# Patient Record
Sex: MALE | Race: WHITE | NOT HISPANIC OR LATINO | Employment: FULL TIME | URBAN - METROPOLITAN AREA
[De-identification: names, ages, dates, MRNs, and addresses within clinical notes are randomized per-mention and may not be internally consistent; named-entity substitution may affect disease eponyms.]

---

## 2020-05-14 ENCOUNTER — TRANSFERRED RECORDS (OUTPATIENT)
Dept: HEALTH INFORMATION MANAGEMENT | Facility: CLINIC | Age: 60
End: 2020-05-14
Payer: COMMERCIAL

## 2020-10-26 ENCOUNTER — THERAPY VISIT (OUTPATIENT)
Dept: PHYSICAL THERAPY | Facility: CLINIC | Age: 60
End: 2020-10-26
Payer: COMMERCIAL

## 2020-10-26 DIAGNOSIS — N50.811 PAIN IN RIGHT TESTICLE: Primary | ICD-10-CM

## 2020-10-26 PROCEDURE — 97161 PT EVAL LOW COMPLEX 20 MIN: CPT | Mod: GP | Performed by: PHYSICAL THERAPIST

## 2020-10-26 PROCEDURE — 97110 THERAPEUTIC EXERCISES: CPT | Mod: GP | Performed by: PHYSICAL THERAPIST

## 2020-10-26 NOTE — LETTER
[facility] ATHLETIC [facility]  [address]  [facility] [address]  [phone]    2020    Re: Saulo Riley   :   1960  MRN:  [id number]   REFERRING PHYSICIAN:   Eligio Díaz    [facility]TH[facility]  Date of Initial Evaluation:  10/26/2020  Visits:  Rxs Used: 1  Reason for Referral:  Pain in right testicle    EVALUATION SUMMARY    [facility] Initial Evaluation  Subjective:  Onset of right testicular pain/swelling  of unknown etiology. 6- pt underwent a hydrocelectomy. Pt has recently noted a return of right groin/testicular pain radiating into the right lower extremity to the knee. Pt referred by MD for physical therapy on 10-.  The history is provided by the patient. No  was used.   Pertinent medical history includes: diabetes, high blood pressure and overweight. Current medications:  High blood pressure medication.    Current occupation is cook.   Primary job tasks include:  Lifting/carrying, prolonged standing and repetitive tasks.   Therapist Generated HPI Evaluation  Type of problem:  Pelvic dysfunction.  This is a chronic condition.  Condition occurred with:  Other reason.  Where condition occurred: other.  Patient reports pain:  Groin (right testicle).  Pain is described as aching (throbbing) and is intermittent.  Pain radiates to:  Anterior thigh right. Pain is worse during the day.  Since onset symptoms are unchanged.  Associated with: none. Symptoms are exacerbated by walking (standing, lifting, stairs, hip flexion. pt denies pain with urination, bowel movements, erections and ejaculations)  and relieved by rest (supporting the testicles).  Imaging testing: pt is scheduled for a testicular ultrasound.  Past treatment: none.   Restrictions due to condition include:  Working in normal job without restrictions.  Barriers include:  None as reported by patient.  Decreased right lower  extremity flexibility:  Adductors; Piriformis; Hip Flexors and Hamstrings    Pelvic Dysfunction Evaluation:    Bladder/Pelvic Problems:  Pt noted pain with right lateral flexion. All other lumbar ROM was pain free.   Flexibility:    Tightness present at:Adductors; Iliopsoas; Hamstrings; Piriformis and Gluteals    Re: Saulo Riley   :   1960    Abdominal Wall:    Trigger Points:  Iliopsoas  External Assessment:    Skin Condition:  Normal  Bearing Down/Coughing:  Normal  Internal Assessment:    Sensory Exam:  Normal  Contraction/Grade:  Weak squeeze, 2 second hold (2)    Hip Evaluation  Hip Strength:    Flexion:   Right: 4/5    Pain: weak/painful  Extension:  Right: 5/5    Pain:    Abduction:  Right: 5/5    Pain:  Adduction:  Right: 5/5   Pain:  Internal Rotation:  Right: 5/5   Pain:  External Rotation:  Right: 5/5   Pain:  Knee Flexion:  Right: 4/5    Pain: weak/painful  Knee Extension:  Right: 4+/5    Pain:    Assessment/Plan:    Patient is a 60 year old male with pelvic complaints.    Patient has the following significant findings with corresponding treatment plan.                Diagnosis 1:  Right testicular pain  Pain -  manual therapy and home program  Decreased ROM/flexibility - manual therapy, therapeutic exercise, therapeutic activity and home program  Decreased strength - therapeutic exercise, therapeutic activities and home program    Therapy Evaluation Codes:   1) History comprised of:   Personal factors that impact the plan of care:      None.    Comorbidity factors that impact the plan of care are:      Diabetes, High blood pressure and Overweight.     Medications impacting care: High blood pressure, Pain and diabetic medication.  2) Examination of Body Systems comprised of:   Body structures and functions that impact the plan of care:      Pelvis.   Activity limitations that impact the plan of care are:      Cooking, Lifting, Stairs, Standing, Walking and Working.  3) Clinical presentation  characteristics are:   Stable/Uncomplicated.  4) Decision-Making    Low complexity using standardized patient assessment instrument and/or measureable assessment of functional outcome.  Cumulative Therapy Evaluation is: Low complexity.            Re: Saulo FEDERICO Osvaldo   :   1960      Previous and current functional limitations:  (See Goal Flow Sheet for this information)    Short term and Long term goals: (See Goal Flow Sheet for this information)     Communication ability:  Patient appears to be able to clearly communicate and understand verbal and written communication and follow directions correctly.  Treatment Explanation - The following has been discussed with the patient:   RX ordered/plan of care  Anticipated outcomes  Possible risks and side effects  This patient would benefit from PT intervention to resume normal activities.   Rehab potential is good.    Frequency:  1 X week, once daily  Duration:  for 6 weeks  Discharge Plan:  Achieve all LTG.  Independent in home treatment program.  Reach maximal therapeutic benefit.          Thank you for your referral.    INQUIRIES  Therapist: Enrrique Hammond [facility] [place]  [address] [address] [address]  Phone: [phone]  Fax: [phone]

## 2020-10-26 NOTE — PROGRESS NOTES
[facility] Initial Evaluation  Subjective:  Onset of right testicular pain/swelling 11-19 of unknown etiology. 6- pt underwent a hydrocelectomy. Pt has recently noted a return of right groin/testicular pain radiating into the right lower extremity to the knee. Pt referred by MD for physical therapy on 10-.    The history is provided by the patient. No  was used.   Therapist Generated HPI Evaluation         Type of problem:  Pelvic dysfunction.    This is a chronic condition.  Condition occurred with:  Other reason.  Where condition occurred: other.  Patient reports pain:  Groin (right testicle).  Pain is described as aching (throbbing) and is intermittent.  Pain radiates to:  Anterior thigh right. Pain is worse during the day.  Since onset symptoms are unchanged.  Associated with: none. Symptoms are exacerbated by walking (standing, lifting, stairs, hip flexion. pt denies pain with urination, bowel movements, erections and ejaculations)  and relieved by rest (supporting the testicles).  Imaging testing: pt is scheduled for a testicular ultrasound.  Past treatment: none.   Restrictions due to condition include:  Working in normal job without restrictions.  Barriers include:  None as reported by patient.                        Objective:        Flexibility/Screens:       Lower Extremity:      Decreased right lower extremity flexibility:  Adductors; Piriformis; Hip Flexors and Hamstrings                                       Pelvic Dysfunction Evaluation:    Bladder/Pelvic Problems:  Pt noted pain with right lateral flexion. All other lumbar ROM was pain free.             Flexibility:    Tightness present at:Adductors; Iliopsoas; Hamstrings; Piriformis and Gluteals    Abdominal Wall:      Trigger Points:  Iliopsoas          External Assessment:    Skin Condition:  Normal    Bearing Down/Coughing:  Normal        Internal Assessment:    Sensory Exam:   Normal  Contraction/Grade:  Weak squeeze, 2 second hold (2)                    Hip Evaluation    Hip Strength:    Flexion:   Right: 4/5    Pain: weak/painful                    Extension:  Right: 5/5    Pain:    Abduction:  Right: 5/5    Pain:  Adduction:  Right: 5/5   Pain:  Internal Rotation:  Right: 5/5   Pain:  External Rotation:  Right: 5/5   Pain:  Knee Flexion:  Right: 4/5    Pain: weak/painful  Knee Extension:  Right: 4+/5    Pain:                       General     ROS    Assessment/Plan:    Patient is a 60 year old male with pelvic complaints.    Patient has the following significant findings with corresponding treatment plan.                Diagnosis 1:  Right testicular pain  Pain -  manual therapy and home program  Decreased ROM/flexibility - manual therapy, therapeutic exercise, therapeutic activity and home program  Decreased strength - therapeutic exercise, therapeutic activities and home program    Therapy Evaluation Codes:   1) History comprised of:   Personal factors that impact the plan of care:      None.    Comorbidity factors that impact the plan of care are:      Diabetes, High blood pressure and Overweight.     Medications impacting care: High blood pressure, Pain and diabetic medication.  2) Examination of Body Systems comprised of:   Body structures and functions that impact the plan of care:      Pelvis.   Activity limitations that impact the plan of care are:      Cooking, Lifting, Stairs, Standing, Walking and Working.  3) Clinical presentation characteristics are:   Stable/Uncomplicated.  4) Decision-Making    Low complexity using standardized patient assessment instrument and/or measureable assessment of functional outcome.  Cumulative Therapy Evaluation is: Low complexity.    Previous and current functional limitations:  (See Goal Flow Sheet for this information)    Short term and Long term goals: (See Goal Flow Sheet for this information)     Communication ability:  Patient appears  to be able to clearly communicate and understand verbal and written communication and follow directions correctly.  Treatment Explanation - The following has been discussed with the patient:   RX ordered/plan of care  Anticipated outcomes  Possible risks and side effects  This patient would benefit from PT intervention to resume normal activities.   Rehab potential is good.    Frequency:  1 X week, once daily  Duration:  for 6 weeks  Discharge Plan:  Achieve all LTG.  Independent in home treatment program.  Reach maximal therapeutic benefit.    Please refer to the daily flowsheet for treatment today, total treatment time and time spent performing 1:1 timed codes.

## 2020-10-26 NOTE — PROGRESS NOTES
[facility] Initial Evaluation  Subjective:    Patient Health History             Pertinent medical history includes: diabetes, high blood pressure and overweight.            Current medications:  High blood pressure medication.    Current occupation is cook.   Primary job tasks include:  Lifting/carrying, prolonged standing and repetitive tasks.                                    Objective:  System    Physical Exam    General     ROS    Assessment/Plan:

## 2020-11-02 ENCOUNTER — THERAPY VISIT (OUTPATIENT)
Dept: PHYSICAL THERAPY | Facility: CLINIC | Age: 60
End: 2020-11-02
Payer: COMMERCIAL

## 2020-11-02 DIAGNOSIS — N50.811 PAIN IN RIGHT TESTICLE: ICD-10-CM

## 2020-11-02 PROCEDURE — 97112 NEUROMUSCULAR REEDUCATION: CPT | Mod: GP | Performed by: PHYSICAL THERAPIST

## 2020-11-02 PROCEDURE — 97110 THERAPEUTIC EXERCISES: CPT | Mod: GP | Performed by: PHYSICAL THERAPIST

## 2020-11-12 ENCOUNTER — THERAPY VISIT (OUTPATIENT)
Dept: PHYSICAL THERAPY | Facility: CLINIC | Age: 60
End: 2020-11-12
Payer: COMMERCIAL

## 2020-11-12 DIAGNOSIS — N50.811 PAIN IN RIGHT TESTICLE: ICD-10-CM

## 2020-11-12 PROCEDURE — 97110 THERAPEUTIC EXERCISES: CPT | Mod: GP | Performed by: PHYSICAL THERAPIST

## 2020-11-12 PROCEDURE — 97140 MANUAL THERAPY 1/> REGIONS: CPT | Mod: GP | Performed by: PHYSICAL THERAPIST

## 2020-11-12 PROCEDURE — 97112 NEUROMUSCULAR REEDUCATION: CPT | Mod: GP | Performed by: PHYSICAL THERAPIST

## 2020-11-12 NOTE — PROGRESS NOTES
Subjective:  HPI  Physical Exam                    Objective:  System    Physical Exam    General     ROS    Assessment/Plan:    SUBJECTIVE  Subjective changes as noted by pt:  Pt reports he was told by the doctor he has an infection. Pt started medication for the infection. Pt reports being sore today secondary to sleeping on his right side for the first time last night.      Current pain level: 4/10     Changes in function:  Pt notes decreased pain with standing and lifting at work  Adverse reaction to treatment or activity:  None    OBJECTIVE  Changes in objective findings:  Pt demonstrates improved control of pelvic floor muscles. Pt can relax pelvic floor muscles to a 1mV resting muscle tone. Trigger point noted right iliopsoas which decreased with sustained manual pressure from therapist.         ASSESSMENT  Saulo continues to require intervention to meet STG and LTG's: PT  Patient's symptoms are resolving.  Response to therapy has shown an improvement in  pain level and muscle control  Progress made towards STG/LTG?  Yes,     PLAN  Current treatment program is being advanced to more complex exercises.    PTA/ATC plan:  N/A    Please refer to the daily flowsheet for treatment today, total treatment time and time spent performing 1:1 timed codes.

## 2020-11-18 ENCOUNTER — AMBULATORY - HEALTHEAST (OUTPATIENT)
Dept: SURGERY | Facility: AMBULATORY SURGERY CENTER | Age: 60
End: 2020-11-18

## 2020-11-18 DIAGNOSIS — Z11.59 ENCOUNTER FOR SCREENING FOR OTHER VIRAL DISEASES: ICD-10-CM

## 2020-11-29 ENCOUNTER — AMBULATORY - HEALTHEAST (OUTPATIENT)
Dept: FAMILY MEDICINE | Facility: CLINIC | Age: 60
End: 2020-11-29

## 2020-11-29 DIAGNOSIS — Z11.59 ENCOUNTER FOR SCREENING FOR OTHER VIRAL DISEASES: ICD-10-CM

## 2020-12-01 ENCOUNTER — COMMUNICATION - HEALTHEAST (OUTPATIENT)
Dept: SCHEDULING | Facility: CLINIC | Age: 60
End: 2020-12-01

## 2020-12-03 ENCOUNTER — ANESTHESIA - HEALTHEAST (OUTPATIENT)
Dept: SURGERY | Facility: AMBULATORY SURGERY CENTER | Age: 60
End: 2020-12-03

## 2020-12-03 ASSESSMENT — MIFFLIN-ST. JEOR: SCORE: 2114

## 2020-12-04 ENCOUNTER — SURGERY - HEALTHEAST (OUTPATIENT)
Dept: SURGERY | Facility: AMBULATORY SURGERY CENTER | Age: 60
End: 2020-12-04

## 2021-01-21 PROBLEM — N50.811 PAIN IN RIGHT TESTICLE: Status: RESOLVED | Noted: 2020-10-26 | Resolved: 2021-01-21

## 2021-01-21 NOTE — PROGRESS NOTES
Subjective:Subjective changes as noted by pt:  Pt reports he was told by the doctor he has an infection. Pt started medication for the infection. Pt reports being sore today secondary to sleeping on his right side for the first time last night.      Current pain level: 4/10     Changes in function:  Pt notes decreased pain with standing and lifting at work  Adverse reaction to treatment or activity:  None     OBJECTIVE  Changes in objective findings:  Pt demonstrates improved control of pelvic floor muscles. Pt can relax pelvic floor muscles to a 1mV resting muscle tone. Trigger point noted right iliopsoas which decreased with sustained manual pressure from therapist.   HPI  Physical Exam                    Objective:  System    Physical Exam    General     ROS    Assessment/Plan:    ASSESSMENT/PLAN  Updated problem list and treatment plan: Diagnosis 1:  Testicular pain  Pain -  hot/cold therapy, self management, education, home program and biofeedback  Decreased ROM/flexibility - therapeutic exercise, therapeutic activity and home program  Decreased strength - therapeutic exercise, therapeutic activities and home program  Progress toward STG/LTGs have been made:  Yes,   Assessment of Progress: The patient's condition is improving.  Self Management Plans:  Patient has been instructed in a home treatment program.  I have re-evaluated this patient and find that the nature, scope, duration and intensity of the therapy is appropriate for the medical condition of the patient.  Saulo continues to require the following intervention to meet STG and LT's:  PT    Recommendations:  Pt has not returned for treatment since 11-. Pt discharged at this time.      Please refer to the daily flowsheet for treatment today, total treatment time and time spent performing 1:1 timed codes.

## 2021-06-05 VITALS — HEIGHT: 68 IN | WEIGHT: 292 LBS | BODY MASS INDEX: 44.25 KG/M2

## 2021-06-13 NOTE — ANESTHESIA CARE TRANSFER NOTE
Last vitals:   Vitals:    12/04/20 1514   BP: 136/73   Pulse: 76   Resp: 16   Temp: 37.1  C (98.7  F)   SpO2: 99%     Patient's level of consciousness is drowsy  Spontaneous respirations: yes  Maintains airway independently: yes  Dentition unchanged: yes  Oropharynx: oropharynx clear of all foreign objects    QCDR Measures:  ASA# 20 - Surgical Safety Checklist: WHO surgical safety checklist completed prior to induction    PQRS# 430 - Adult PONV Prevention: 4558F - Pt received => 2 anti-emetic agents (different classes) preop & intraop  ASA# 8 - Peds PONV Prevention: NA - Not pediatric patient, not GA or 2 or more risk factors NOT present  PQRS# 424 - Almaz-op Temp Management: 4559F - At least one body temp DOCUMENTED => 35.5C or 95.9F within required timeframe  PQRS# 426 - PACU Transfer Protocol: - Transfer of care checklist used  ASA# 14 - Acute Post-op Pain: ASA14B - Patient did NOT experience pain >= 7 out of 10  
Yes

## 2021-06-13 NOTE — ANESTHESIA POSTPROCEDURE EVALUATION
Patient: Saulo Riley  Procedure(s):  HYDROCELECTOMY  Anesthesia type: general    Patient location: Phase II Recovery  Last vitals:   Vitals Value Taken Time   /85 12/04/20 1601   Temp 36.5  C (97.7  F) 12/04/20 1554   Pulse 72 12/04/20 1605   Resp 16 12/04/20 1554   SpO2 94 % 12/04/20 1605   Vitals shown include unvalidated device data.  Post vital signs: stable  Level of consciousness: awake and responds to simple questions  Post-anesthesia pain: pain controlled  Post-anesthesia nausea and vomiting: no  Pulmonary: unassisted, return to baseline  Cardiovascular: stable and blood pressure at baseline  Hydration: adequate  Anesthetic events: no    QCDR Measures:  ASA# 11 - Almaz-op Cardiac Arrest: ASA11B - Patient did NOT experience unanticipated cardiac arrest  ASA# 12 - Almaz-op Mortality Rate: ASA12B - Patient did NOT die  ASA# 13 - PACU Re-Intubation Rate: ASA13B - Patient did NOT require a new airway mgmt  ASA# 10 - Composite Anes Safety: ASA10A - No serious adverse event    Additional Notes:

## 2021-06-13 NOTE — ANESTHESIA PREPROCEDURE EVALUATION
Anesthesia Evaluation      Patient summary reviewed     Airway   Mallampati: II  Neck ROM: full   Pulmonary - negative ROS and normal exam                          Cardiovascular - normal exam  (+) hypertension, , hypercholesterolemia,      Neuro/Psych    (+) CVA ,     Endo/Other    (+) diabetes mellitus type 2, obesity,      GI/Hepatic/Renal - negative ROS           Dental    (+) poor dentition and caps                       Anesthesia Plan  Planned anesthetic: general endotracheal    COVID neg 11/29    GETA - have glidescope nearby  1 PIV  Fentanyl prn, dilaudid if needed  Decadron, zofran   ASA 3   Induction: intravenous   Anesthetic plan and risks discussed with: patient  Anesthesia plan special considerations: increased risk of difficult airway,   Post-op plan: routine recovery

## 2022-04-06 ENCOUNTER — MEDICAL CORRESPONDENCE (OUTPATIENT)
Dept: HEALTH INFORMATION MANAGEMENT | Facility: CLINIC | Age: 62
End: 2022-04-06
Payer: COMMERCIAL

## 2022-04-06 ENCOUNTER — TRANSFERRED RECORDS (OUTPATIENT)
Dept: HEALTH INFORMATION MANAGEMENT | Facility: CLINIC | Age: 62
End: 2022-04-06
Payer: COMMERCIAL

## 2022-04-07 ENCOUNTER — TRANSCRIBE ORDERS (OUTPATIENT)
Dept: OTHER | Age: 62
End: 2022-04-07
Payer: COMMERCIAL

## 2022-04-07 DIAGNOSIS — R10.31 RIGHT INGUINAL PAIN: Primary | ICD-10-CM

## 2022-04-25 ENCOUNTER — OFFICE VISIT (OUTPATIENT)
Dept: SURGERY | Facility: CLINIC | Age: 62
End: 2022-04-25
Attending: UROLOGY
Payer: COMMERCIAL

## 2022-04-25 VITALS — WEIGHT: 292.6 LBS | HEIGHT: 68 IN | BODY MASS INDEX: 44.34 KG/M2

## 2022-04-25 DIAGNOSIS — R10.31 RIGHT INGUINAL PAIN: ICD-10-CM

## 2022-04-25 PROCEDURE — 99204 OFFICE O/P NEW MOD 45 MIN: CPT | Performed by: SURGERY

## 2022-04-25 RX ORDER — ALLOPURINOL 300 MG/1
300 TABLET ORAL
COMMUNITY
Start: 2022-03-29 | End: 2022-07-07

## 2022-04-25 RX ORDER — LISINOPRIL 40 MG/1
40 TABLET ORAL DAILY
COMMUNITY
Start: 2022-03-15

## 2022-04-25 RX ORDER — SITAGLIPTIN 100 MG/1
100 TABLET, FILM COATED ORAL DAILY
COMMUNITY
Start: 2022-02-25

## 2022-04-25 RX ORDER — GABAPENTIN 100 MG/1
CAPSULE ORAL PRN
COMMUNITY
Start: 2021-04-28

## 2022-04-25 NOTE — LETTER
4/25/2022         RE: Saulo Riley  [address] [address] [facility] [address]        Dear Colleague,    Thank you for referring your patient, Saulo Riley, to the [facility] [place]. Please see a copy of my visit note below.    HPI:  Saulo Riley is a 62 year old male who was referred to me by Francisco Herring for an inguinal hernia. He  presents today with complaints of right inner thigh discomfort for which she has had for several months.  He had back surgery for right lower extremity/shin discomfort and is currently recovering from this.  He has had a hydrocelectomy in the past on the same side.  He continues to have a right thigh discomfort which he states disappears when he squeezes his scrotum.  He denies any obvious bulges in the right inguinal space.  He denies any hip pain but has had a left-sided similar pain in the past which has since resolved.  He has difficulty raising his right leg up secondary to the discomfort.  He denies any fevers, chills, bulges or any other symptoms.    Allergies:Patient has no known allergies.    Past Medical History:   Diagnosis Date     Diabetes mellitus (H)      Hypertension      Stroke (H)     1980       Past Surgical History:   Procedure Laterality Date     BACK SURGERY       HYDROCELECTOMY SCROTAL       HYDROCELECTOMY SCROTAL N/A 12/4/2020    Procedure: HYDROCELECTOMY;  Surgeon: Eligio Díaz MD;  Location: [facility];  Service: Urology     NECK SURGERY       TONSILLECTOMY         CURRENT MEDS:  Current Outpatient Medications   Medication Sig Dispense Refill     allopurinoL (ZYLOPRIM) 300 MG tablet [ALLOPURINOL (ZYLOPRIM) 300 MG TABLET] Take 300 mg by mouth.       aspirin 81 MG EC tablet [ASPIRIN 81 MG EC TABLET] Take 81 mg by mouth daily.       atorvastatin (LIPITOR) 80 MG tablet [ATORVASTATIN (LIPITOR) 80 MG TABLET] Take 80 mg by mouth.       gabapentin (NEURONTIN) 100 MG capsule TAKE 2 CAPSULES (200  "MG) BY MOUTH AT BEDTIME.       JANUVIA 100 MG tablet Take 100 mg by mouth daily       lisinopril (ZESTRIL) 40 MG tablet Take 40 mg by mouth daily       metFORMIN (GLUCOPHAGE) 1000 MG tablet [METFORMIN (GLUCOPHAGE) 1000 MG TABLET] Take 1,000 mg by mouth.       allopurinol (ZYLOPRIM) 300 MG tablet Take 300 mg by mouth (Patient not taking: Reported on 4/25/2022)       lisinopriL (PRINIVIL,ZESTRIL) 20 MG tablet [LISINOPRIL (PRINIVIL,ZESTRIL) 20 MG TABLET] Take 20 mg by mouth. (Patient not taking: Reported on 4/25/2022)         Family history-reviewed and  non-contributory     reports that he has never smoked. He has never used smokeless tobacco. He reports previous alcohol use. He reports previous drug use.    Review of Systems -   The 12 system review is within normal limits except for as mentioned above.  General ROS: No complaints or constitutional symptoms  Ophthalmic ROS: No complaints of visual changes  Skin: No complaints or symptoms   Endocrine: No complaints or symptoms  Hematologic/Lymphatic: No symptoms or complaints  Psychiatric: No symptoms or complaints  Respiratory ROS: no cough, shortness of breath, or wheezing  Cardiovascular ROS: no chest pain or dyspnea on exertion  Gastrointestinal ROS: As per HPI  Genito-Urinary ROS: no dysuria, trouble voiding, or hematuria  Musculoskeletal ROS: no joint or muscle pain  Neurological ROS: no TIA or stroke symptoms    Ht 1.727 m (5' 8\")   Wt 132.7 kg (292 lb 9.6 oz)   BMI 44.49 kg/m    Body mass index is 44.49 kg/m .    EXAM:  GENERAL: Well developed male, No acute distress, pleasant and conversant   EYES: Pupils equal, round and reactive, no scleral icterus  ABDOMEN: Obese, no palpable or appreciated right inguinal hernias or left inguinal hernias, nontender to deep palpation of the internal ring  SKIN: Pink, warm and dry, no obvious rashes or lesions   NEURO:No focal deficits, ambulatory  MUSCULOSKELETAL:No obvious deformities, no swelling, normal " appearing          IMAGES:   Relevant images were reviewed and discussed with the patient.  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  He states the pain goes away when he squeezes his testicle.  This is not necessarily characteristic for an inguinal hernia.  He has had recent back surgery for similar pain although this was in the lower neurologic distribution.  I was not able to appreciate any obvious hernias on my physical exam.  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  The hydrocele has since been repaired.  It is not clear whether or not the spermatic cord lipoma is causing the discomfort either and this would be a rather odd presentation especially with the neurologic distribution and the difficulty raising his leg.  At this point I will repeat a CT scan of the abdomen and pelvis given the fact that the hydrocele has been repaired and I would like to see what the anatomy looks like.  I will call him with the results once this is done.      Peter Marcelo D.O. [facility]  [phone]  [facility] Department of Surgery      Again, thank you for allowing me to participate in the care of your patient.        Sincerely,        Fede Marcelo, DO

## 2022-04-25 NOTE — PROGRESS NOTES
HPI:  Saulo Riley is a 62 year old male who was referred to me by Francisco Herring for an inguinal hernia. He  presents today with complaints of right inner thigh discomfort for which she has had for several months.  He had back surgery for right lower extremity/shin discomfort and is currently recovering from this.  He has had a hydrocelectomy in the past on the same side.  He continues to have a right thigh discomfort which he states disappears when he squeezes his scrotum.  He denies any obvious bulges in the right inguinal space.  He denies any hip pain but has had a left-sided similar pain in the past which has since resolved.  He has difficulty raising his right leg up secondary to the discomfort.  He denies any fevers, chills, bulges or any other symptoms.    Allergies:Patient has no known allergies.    Past Medical History:   Diagnosis Date     Diabetes mellitus (H)      Hypertension      Stroke (H)     1980       Past Surgical History:   Procedure Laterality Date     BACK SURGERY       HYDROCELECTOMY SCROTAL       HYDROCELECTOMY SCROTAL N/A 12/4/2020    Procedure: HYDROCELECTOMY;  Surgeon: Eligio Díaz MD;  Location: [facility];  Service: Urology     NECK SURGERY       TONSILLECTOMY         CURRENT MEDS:  Current Outpatient Medications   Medication Sig Dispense Refill     allopurinoL (ZYLOPRIM) 300 MG tablet [ALLOPURINOL (ZYLOPRIM) 300 MG TABLET] Take 300 mg by mouth.       aspirin 81 MG EC tablet [ASPIRIN 81 MG EC TABLET] Take 81 mg by mouth daily.       atorvastatin (LIPITOR) 80 MG tablet [ATORVASTATIN (LIPITOR) 80 MG TABLET] Take 80 mg by mouth.       gabapentin (NEURONTIN) 100 MG capsule TAKE 2 CAPSULES (200 MG) BY MOUTH AT BEDTIME.       JANUVIA 100 MG tablet Take 100 mg by mouth daily       lisinopril (ZESTRIL) 40 MG tablet Take 40 mg by mouth daily       metFORMIN (GLUCOPHAGE) 1000 MG tablet [METFORMIN (GLUCOPHAGE) 1000 MG TABLET] Take 1,000 mg by mouth.       allopurinol (ZYLOPRIM) 300  "MG tablet Take 300 mg by mouth (Patient not taking: Reported on 4/25/2022)       lisinopriL (PRINIVIL,ZESTRIL) 20 MG tablet [LISINOPRIL (PRINIVIL,ZESTRIL) 20 MG TABLET] Take 20 mg by mouth. (Patient not taking: Reported on 4/25/2022)         Family history-reviewed and  non-contributory     reports that he has never smoked. He has never used smokeless tobacco. He reports previous alcohol use. He reports previous drug use.    Review of Systems -   The 12 system review is within normal limits except for as mentioned above.  General ROS: No complaints or constitutional symptoms  Ophthalmic ROS: No complaints of visual changes  Skin: No complaints or symptoms   Endocrine: No complaints or symptoms  Hematologic/Lymphatic: No symptoms or complaints  Psychiatric: No symptoms or complaints  Respiratory ROS: no cough, shortness of breath, or wheezing  Cardiovascular ROS: no chest pain or dyspnea on exertion  Gastrointestinal ROS: As per HPI  Genito-Urinary ROS: no dysuria, trouble voiding, or hematuria  Musculoskeletal ROS: no joint or muscle pain  Neurological ROS: no TIA or stroke symptoms    Ht 1.727 m (5' 8\")   Wt 132.7 kg (292 lb 9.6 oz)   BMI 44.49 kg/m    Body mass index is 44.49 kg/m .    EXAM:  GENERAL: Well developed male, No acute distress, pleasant and conversant   EYES: Pupils equal, round and reactive, no scleral icterus  ABDOMEN: Obese, no palpable or appreciated right inguinal hernias or left inguinal hernias, nontender to deep palpation of the internal ring  SKIN: Pink, warm and dry, no obvious rashes or lesions   NEURO:No focal deficits, ambulatory  MUSCULOSKELETAL:No obvious deformities, no swelling, normal appearing          IMAGES:   Relevant images were reviewed and discussed with the patient.  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  He states the pain goes away when he squeezes his testicle.  This is not necessarily characteristic for an inguinal hernia.  He has had recent back surgery for similar pain although this was in the lower neurologic distribution.  I was not able to appreciate any obvious hernias on my physical exam.  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  The hydrocele has since been repaired.  It is not clear whether or not the spermatic cord lipoma is causing the discomfort either and this would be a rather odd presentation especially with the neurologic distribution and the difficulty raising his leg.  At this point I will repeat a CT scan of the abdomen and pelvis given the fact that the hydrocele has been repaired and I would like to see what the anatomy looks like.  I will call him with the results once this is done.      Peter Marcelo D.O. [facility]  [phone]  [facility] Department of Surgery

## 2022-04-26 ENCOUNTER — TELEPHONE (OUTPATIENT)
Dept: SURGERY | Facility: CLINIC | Age: 62
End: 2022-04-26
Payer: COMMERCIAL

## 2022-04-26 DIAGNOSIS — R10.31 RIGHT GROIN PAIN: Primary | ICD-10-CM

## 2022-04-26 NOTE — TELEPHONE ENCOUNTER
Thomas called today asking to get his CT scan scheduled. He would like to have this done at [facility] in [place]. [phone]

## 2022-04-28 ENCOUNTER — TELEPHONE (OUTPATIENT)
Dept: SURGERY | Facility: CLINIC | Age: 62
End: 2022-04-28
Payer: COMMERCIAL

## 2022-04-28 NOTE — TELEPHONE ENCOUNTER
Thomas called back still hasnt heard about the CT scan.    Please call Thomas and advise.    Thanks!

## 2022-05-06 ENCOUNTER — TRANSCRIBE ORDERS (OUTPATIENT)
Dept: OTHER | Age: 62
End: 2022-05-06
Payer: COMMERCIAL

## 2022-05-06 DIAGNOSIS — R10.31 CHRONIC PAIN OF RIGHT INGUINAL REGION: Primary | ICD-10-CM

## 2022-05-06 DIAGNOSIS — G89.29 CHRONIC PAIN OF RIGHT INGUINAL REGION: Primary | ICD-10-CM

## 2022-05-06 DIAGNOSIS — Z98.890 HISTORY OF HYDROCELECTOMY: ICD-10-CM

## 2022-06-21 ENCOUNTER — ONCOLOGY VISIT (OUTPATIENT)
Dept: ONCOLOGY | Facility: CLINIC | Age: 62
End: 2022-06-21
Attending: FAMILY MEDICINE
Payer: COMMERCIAL

## 2022-06-21 VITALS
HEART RATE: 76 BPM | DIASTOLIC BLOOD PRESSURE: 75 MMHG | HEIGHT: 69 IN | WEIGHT: 289.8 LBS | TEMPERATURE: 98 F | RESPIRATION RATE: 18 BRPM | SYSTOLIC BLOOD PRESSURE: 141 MMHG | OXYGEN SATURATION: 96 % | BODY MASS INDEX: 42.92 KG/M2

## 2022-06-21 DIAGNOSIS — R10.31 CHRONIC PAIN OF RIGHT INGUINAL REGION: Primary | ICD-10-CM

## 2022-06-21 DIAGNOSIS — G89.29 CHRONIC PAIN OF RIGHT INGUINAL REGION: Primary | ICD-10-CM

## 2022-06-21 DIAGNOSIS — Z98.890 HISTORY OF HYDROCELECTOMY: ICD-10-CM

## 2022-06-21 PROCEDURE — G0463 HOSPITAL OUTPT CLINIC VISIT: HCPCS

## 2022-06-21 PROCEDURE — 99213 OFFICE O/P EST LOW 20 MIN: CPT | Performed by: STUDENT IN AN ORGANIZED HEALTH CARE EDUCATION/TRAINING PROGRAM

## 2022-06-21 ASSESSMENT — PAIN SCALES - GENERAL: PAINLEVEL: EXTREME PAIN (8)

## 2022-06-21 NOTE — LETTER
"    6/21/2022         RE: Saulo Riley  [address] [place] [place] [address]        Dear Colleague,    Thank you for referring your patient, Saulo Riley, to the [facility]. Please see a copy of my visit note below.    Oncology Rooming Note    June 21, 2022 1:56 PM   Saulo Riley is a 62 year old male who presents for:    Chief Complaint   Patient presents with     Urology     Initial Vitals: BP (!) 141/75   Pulse 76   Temp 98  F (36.7  C)   Resp 18   Ht 1.753 m (5' 9\")   Wt 131.5 kg (289 lb 12.8 oz)   SpO2 96%   BMI 42.80 kg/m   Estimated body mass index is 42.8 kg/m  as calculated from the following:    Height as of this encounter: 1.753 m (5' 9\").    Weight as of this encounter: 131.5 kg (289 lb 12.8 oz). Body surface area is 2.53 meters squared.  Extreme Pain (8) Comment: Data Unavailable   No LMP for male patient.  Allergies reviewed: Yes  Medications reviewed: Yes    Medications: Medication refills not needed today.  Pharmacy name entered into [employer]: [id number] - [place], [place] - [address]    Clinical concerns: None       Rosy Coburn LPN                    Chief Complaint:   Chronic right inguinal pain           Consult or Referral:     Mr. Saulo Riley is a 62 year old male seen at the request of Dr. Herring.         History of Present Illness:     Saulo Riley is a 62 year old male being seen for chronic right inguinal pain.  Duration of problem: 2 years  Previous treatments: Hydrocelectomy on the right side 2 years ago      Reviewed previous notes from Dr. 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 and a hydrocele which was large size on the right side  Subsequently had hydrocele surgery but persist to have pain on the inguinal region on the right side along " with occasional pain on the left side  He does have some residual left hydrocele which has not changed much over the last couple of years  He has seen his primary operating surgeon who had referred him to general surgery but he was concerned about getting repeat imaging done with a CT scan as recommended by his general surgeon in view of the high costs involved  He feels that he is having a lot of pain that is limiting his ability to work               Past Medical History:     Past Medical History:   Diagnosis Date     Diabetes mellitus (H)      Hypertension      Stroke (H)     1980            Past Surgical History:     Past Surgical History:   Procedure Laterality Date     BACK SURGERY       HYDROCELECTOMY SCROTAL       HYDROCELECTOMY SCROTAL N/A 12/4/2020    Procedure: HYDROCELECTOMY;  Surgeon: Eligio Díaz MD;  Location: [facility];  Service: Urology     NECK SURGERY       TONSILLECTOMY              Medications     Current Outpatient Medications   Medication     allopurinoL (ZYLOPRIM) 300 MG tablet     aspirin 81 MG EC tablet     atorvastatin (LIPITOR) 80 MG tablet     gabapentin (NEURONTIN) 100 MG capsule     JANUVIA 100 MG tablet     lisinopril (ZESTRIL) 40 MG tablet     metFORMIN (GLUCOPHAGE) 1000 MG tablet     allopurinol (ZYLOPRIM) 300 MG tablet     lisinopriL (PRINIVIL,ZESTRIL) 20 MG tablet     No current facility-administered medications for this visit.            Family History:   No family history on file.         Social History:     Social History     Socioeconomic History     Marital status:      Spouse name: Not on file     Number of children: Not on file     Years of education: Not on file     Highest education level: Not on file   Occupational History     Not on file   Tobacco Use     Smoking status: Never Smoker     Smokeless tobacco: Never Used   Substance and Sexual Activity     Alcohol use: Not Currently     Drug use: Not Currently     Sexual activity: Not on file   Other  "Topics Concern     Not on file   Social History Narrative     Not on file     Social Determinants of Health     Financial Resource Strain: Not on file   Food Insecurity: Not on file   Transportation Needs: Not on file   Physical Activity: Not on file   Stress: Not on file   Social Connections: Not on file   Intimate Partner Violence: Not on file   Housing Stability: Not on file            Allergies:   Patient has no known allergies.         Review of Systems:  From intake questionnaire     Skin: negative  Eyes: negative  Ears/Nose/Throat: negative  Respiratory: No shortness of breath, dyspnea on exertion, cough, or hemoptysis  Cardiovascular: No chest pain or palpitations  Gastrointestinal: negative; no nausea/vomiting, constipation or diarrhea  Genitourinary: as per HPI  Musculoskeletal: negative  Neurologic: negative  Psychiatric: negative  Hematologic/Lymphatic/Immunologic: negative  Endocrine: negative         Physical Exam:     Patient is a 62 year old  male   Vitals: Blood pressure (!) 141/75, pulse 76, temperature 98  F (36.7  C), resp. rate 18, height 1.753 m (5' 9\"), weight 131.5 kg (289 lb 12.8 oz), SpO2 96 %.  Constitutional: Body mass index is 42.8 kg/m .  Alert, no acute distress, oriented, conversant  Eyes: no scleral icterus; extraocular muscles intact, moist conjunctivae  Neck: trachea midline, no thyromegaly  Ears/nose/mouth: throat/mouth:normal, good dentition  Respiratory: no respiratory distress, or pursed lip breathing  Cardiovascular: pulses strong and intact; no obvious jugular venous distension present  Gastrointestinal: soft, nontender, no organomegaly or masses,   Lymphatics: No inguinal adenopathy  Musculoskeletal: extremities normal, no peripheral edema  Skin: no suspicious lesions or rashes  Neuro: Alert, oriented, speech and mentation normal  Psych: affect and mood normal, alert and oriented to person, place and time  Gait: Normal  : Fullness in the inguinal region bilaterally,  Left " small hydrocele  Right thickened cord with a feeling of lumpiness  Buried penis  Could not appreciate a hernia      Labs and Pathology:    The following labs were reviewed by me and discussed with the patient:    Significant for No results found for: CR  No results found for: PSA          Imaging:    The following imaging exams were independently viewed and interpreted by me and discussed with patient:  CT Scan Abd/Pelvis: Abnormal: 2020 May    Right cord lipoma versus fat-containing hernia  Bilateral hydrocele             Assessment and Plan:     Chronic pain of right inguinal region  Referred to general surgery at [facility] for evaluation of possible cord lipoma versus hernia  Discussed with him that he may need a procedure for this if his pain could be attributable to the lipoma/hernia    - Adult General Surg Referral; Future    History of hydrocelectomy  Prior history of hydrocelectomy on the right side  No evidence of hydrocele currently on the right small hydrocele on the left no need for further intervention  - Adult General Surg Referral; Future      Plan:  To see general surgery  See us as needed    Orders  Orders Placed This Encounter   Procedures     Adult General Surg Referral       Zak Catalan MD  [facility]      ==========================    Additional Billing and Coding Information:  Review of external notes as documented above   Review of the result(s) of each unique test - CT abdomen and pelvis    Independent interpretation of a test performed by another physician/other qualified health care professional (not separately reported) -       Discussion of management or test interpretation with external physician/other qualified healthcare professional/appropriate source -       Diagnosis or treatment significantly limited by social determinants of health -       25 minutes spent on the date of the encounter doing chart review, review of test results, interpretation of  tests, patient visit and documentation     ==========================      Again, thank you for allowing me to participate in the care of your patient.        Sincerely,        Zak Catalan MD

## 2022-06-21 NOTE — PROGRESS NOTES
Chief Complaint:   Chronic right inguinal pain           Consult or Referral:     Mr. Saulo Riley is a 62 year old male seen at the request of Dr. Herring.         History of Present Illness:     Saulo Riley is a 62 year old male being seen for chronic right inguinal pain.  Duration of problem: 2 years  Previous treatments: Hydrocelectomy on the right side 2 years ago      Reviewed previous notes from Dr. 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 and a hydrocele which was large size on the right side  Subsequently had hydrocele surgery but persist to have pain on the inguinal region on the right side along with occasional pain on the left side  He does have some residual left hydrocele which has not changed much over the last couple of years  He has seen his primary operating surgeon who had referred him to general surgery but he was concerned about getting repeat imaging done with a CT scan as recommended by his general surgeon in view of the high costs involved  He feels that he is having a lot of pain that is limiting his ability to work               Past Medical History:     Past Medical History:   Diagnosis Date     Diabetes mellitus (H)      Hypertension      Stroke (H)     1980            Past Surgical History:     Past Surgical History:   Procedure Laterality Date     BACK SURGERY       HYDROCELECTOMY SCROTAL       HYDROCELECTOMY SCROTAL N/A 12/4/2020    Procedure: HYDROCELECTOMY;  Surgeon: Eligio Díaz MD;  Location: [facility];  Service: Urology     NECK SURGERY       TONSILLECTOMY              Medications     Current Outpatient Medications   Medication     allopurinoL (ZYLOPRIM) 300 MG tablet     aspirin 81 MG EC tablet     atorvastatin (LIPITOR) 80 MG tablet     gabapentin (NEURONTIN) 100 MG capsule     JANUVIA  "100 MG tablet     lisinopril (ZESTRIL) 40 MG tablet     metFORMIN (GLUCOPHAGE) 1000 MG tablet     allopurinol (ZYLOPRIM) 300 MG tablet     lisinopriL (PRINIVIL,ZESTRIL) 20 MG tablet     No current facility-administered medications for this visit.            Family History:   No family history on file.         Social History:     Social History     Socioeconomic History     Marital status:      Spouse name: Not on file     Number of children: Not on file     Years of education: Not on file     Highest education level: Not on file   Occupational History     Not on file   Tobacco Use     Smoking status: Never Smoker     Smokeless tobacco: Never Used   Substance and Sexual Activity     Alcohol use: Not Currently     Drug use: Not Currently     Sexual activity: Not on file   Other Topics Concern     Not on file   Social History Narrative     Not on file     Social Determinants of Health     Financial Resource Strain: Not on file   Food Insecurity: Not on file   Transportation Needs: Not on file   Physical Activity: Not on file   Stress: Not on file   Social Connections: Not on file   Intimate Partner Violence: Not on file   Housing Stability: Not on file            Allergies:   Patient has no known allergies.         Review of Systems:  From intake questionnaire     Skin: negative  Eyes: negative  Ears/Nose/Throat: negative  Respiratory: No shortness of breath, dyspnea on exertion, cough, or hemoptysis  Cardiovascular: No chest pain or palpitations  Gastrointestinal: negative; no nausea/vomiting, constipation or diarrhea  Genitourinary: as per HPI  Musculoskeletal: negative  Neurologic: negative  Psychiatric: negative  Hematologic/Lymphatic/Immunologic: negative  Endocrine: negative         Physical Exam:     Patient is a 62 year old  male   Vitals: Blood pressure (!) 141/75, pulse 76, temperature 98  F (36.7  C), resp. rate 18, height 1.753 m (5' 9\"), weight 131.5 kg (289 lb 12.8 oz), SpO2 96 %.  Constitutional: " Body mass index is 42.8 kg/m .  Alert, no acute distress, oriented, conversant  Eyes: no scleral icterus; extraocular muscles intact, moist conjunctivae  Neck: trachea midline, no thyromegaly  Ears/nose/mouth: throat/mouth:normal, good dentition  Respiratory: no respiratory distress, or pursed lip breathing  Cardiovascular: pulses strong and intact; no obvious jugular venous distension present  Gastrointestinal: soft, nontender, no organomegaly or masses,   Lymphatics: No inguinal adenopathy  Musculoskeletal: extremities normal, no peripheral edema  Skin: no suspicious lesions or rashes  Neuro: Alert, oriented, speech and mentation normal  Psych: affect and mood normal, alert and oriented to person, place and time  Gait: Normal  : Fullness in the inguinal region bilaterally,  Left small hydrocele  Right thickened cord with a feeling of lumpiness  Buried penis  Could not appreciate a hernia      Labs and Pathology:    The following labs were reviewed by me and discussed with the patient:    Significant for No results found for: CR  No results found for: PSA          Imaging:    The following imaging exams were independently viewed and interpreted by me and discussed with patient:  CT Scan Abd/Pelvis: Abnormal: 2020 May    Right cord lipoma versus fat-containing hernia  Bilateral hydrocele             Assessment and Plan:     Chronic pain of right inguinal region  Referred to general surgery at [place] for evaluation of possible cord lipoma versus hernia  Discussed with him that he may need a procedure for this if his pain could be attributable to the lipoma/hernia    - Adult General Surg Referral; Future    History of hydrocelectomy  Prior history of hydrocelectomy on the right side  No evidence of hydrocele currently on the right small hydrocele on the left no need for further intervention  - Adult General Surg Referral; Future      Plan:  To see general surgery  See us as needed    Orders  Orders Placed This  Encounter   Procedures     Adult General Surg Referral       Zak Catalan MD  [facility]      ==========================    Additional Billing and Coding Information:  Review of external notes as documented above   Review of the result(s) of each unique test - CT abdomen and pelvis    Independent interpretation of a test performed by another physician/other qualified health care professional (not separately reported) -       Discussion of management or test interpretation with external physician/other qualified healthcare professional/appropriate source -       Diagnosis or treatment significantly limited by social determinants of health -       25 minutes spent on the date of the encounter doing chart review, review of test results, interpretation of tests, patient visit and documentation     ==========================

## 2022-06-21 NOTE — PROGRESS NOTES
"Oncology Rooming Note    June 21, 2022 1:56 PM   Saulo Riley is a 62 year old male who presents for:    Chief Complaint   Patient presents with     Urology     Initial Vitals: BP (!) 141/75   Pulse 76   Temp 98  F (36.7  C)   Resp 18   Ht 1.753 m (5' 9\")   Wt 131.5 kg (289 lb 12.8 oz)   SpO2 96%   BMI 42.80 kg/m   Estimated body mass index is 42.8 kg/m  as calculated from the following:    Height as of this encounter: 1.753 m (5' 9\").    Weight as of this encounter: 131.5 kg (289 lb 12.8 oz). Body surface area is 2.53 meters squared.  Extreme Pain (8) Comment: Data Unavailable   No LMP for male patient.  Allergies reviewed: Yes  Medications reviewed: Yes    Medications: Medication refills not needed today.  Pharmacy name entered into [employer]: [id number] - [place], [place] - [address]    Clinical concerns: None       Rosy Coburn LPN            "

## 2022-07-07 ENCOUNTER — OFFICE VISIT (OUTPATIENT)
Dept: SURGERY | Facility: CLINIC | Age: 62
End: 2022-07-07
Payer: COMMERCIAL

## 2022-07-07 VITALS
HEART RATE: 66 BPM | BODY MASS INDEX: 43.25 KG/M2 | SYSTOLIC BLOOD PRESSURE: 136 MMHG | RESPIRATION RATE: 16 BRPM | WEIGHT: 292 LBS | OXYGEN SATURATION: 96 % | HEIGHT: 69 IN | DIASTOLIC BLOOD PRESSURE: 88 MMHG

## 2022-07-07 DIAGNOSIS — R10.31 RIGHT GROIN PAIN: Primary | ICD-10-CM

## 2022-07-07 DIAGNOSIS — N50.89 SWELLING OF RIGHT HALF OF SCROTUM: Primary | ICD-10-CM

## 2022-07-07 DIAGNOSIS — E66.01 MORBID OBESITY (H): ICD-10-CM

## 2022-07-07 PROCEDURE — 99213 OFFICE O/P EST LOW 20 MIN: CPT | Performed by: SURGERY

## 2022-07-07 NOTE — PROGRESS NOTES
"Saulo is a 62 year old male who presents for evaluation for inguinal hernia.  Symptoms began 2 years ago.  This is described as aching and is located in the right scrotum with radiation to the inner thigh and around to the low back.  He notes the discomfort when he is on his feet for extended hours and states that if he pushes his scrotum back up he has relief and notes that the swelling goes down. He had a right hydrocelectomy about two years ago which did not improve his symptoms. The patient has noticed a bulge. He also has a reported CT which shows a possible cord lipoma on the right. 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    Constipation No   Dysuria No  Cough No    Pt's chart has been reviewed for PMH, PSH, allergies, medications, social history and family history.    ROS:  Pulm:  No shortness of breath, dyspnea on exertion, cough, or hemoptysis  CV:  negative  ABD:  See chief complaint  :  Negative  All other systems negative/normal    /88   Pulse 66   Resp 16   Ht 1.753 m (5' 9\")   Wt 132.5 kg (292 lb)   SpO2 96%   BMI 43.12 kg/m    Physical exam:  Patient able to get up on table with mild difficulty.  Abdomen is abdomen is soft without significant tenderness, masses, organomegaly or guarding  bowel sounds are positive and no caput medusa noted.  Hernia  Left inguinal hernia is not present with valsalva              Right inguinal hernia is not present with valsalva              Testicles are normal though there is subtle swelling bilaterally    Assesment: no inguinal hernia clinically evident.    Plan: The nature of hernias, anatomic considerations, indications and approaches to repair were discussed. I relayed that some of his symptoms sounded consistent with a hernia but I did not detect one on exam and the site of swelling would imply an ilioscrotal hernia or communicating hydrocele, the former of which was not evident. I've offered and " recommended a CT scan done with Valsalva to better define the problem. I will phone him with the results when completed.      Jose Solares MD  7/7/2022 11:21 AM    Please route or send letter to:  Primary Care Provider (PCP)

## 2022-07-07 NOTE — PATIENT INSTRUCTIONS
CT ABDOMEN AND PELVIS WITH VALSALVA      Date: 7/9/2022  Time: 10:00 AM   Location[place] [address] [place]  [address]       Please check in at 9:45 AM       Preparation for CT scanning    Please bring an updated list of all your medications, including IV Chemo Therapy over the counter and herbal supplements.    For questions regarding your test please call [phone].   If you are taking any medications and you have questions, please call your primary care physician.

## 2022-07-07 NOTE — LETTER
"2022       Re: Saulo CABALLERO Osvaldo - 1960    Saulo is a 62 year old male who presents for evaluation for inguinal hernia.  Symptoms began 2 years ago.  This is described as aching and is located in the right scrotum with radiation to the inner thigh and around to the low back.  He notes the discomfort when he is on his feet for extended hours and states that if he pushes his scrotum back up he has relief and notes that the swelling goes down. He had a right hydrocelectomy about two years ago which did not improve his symptoms. The patient has noticed a bulge. He also has a reported CT which shows a possible cord lipoma on the right. 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     Constipation No   Dysuria No  Cough No     Pt's chart has been reviewed for PMH, PSH, allergies, medications, social history and family history.     ROS:  Pulm:  No shortness of breath, dyspnea on exertion, cough, or hemoptysis  CV:  negative  ABD:  See chief complaint  :  Negative  All other systems negative/normal     /88   Pulse 66   Resp 16   Ht 1.753 m (5' 9\")   Wt 132.5 kg (292 lb)   SpO2 96%   BMI 43.12 kg/m    Physical exam:  Patient able to get up on table with mild difficulty.  Abdomen is abdomen is soft without significant tenderness, masses, organomegaly or guarding  bowel sounds are positive and no caput medusa noted.  Hernia  Left inguinal hernia is not present with valsalva              Right inguinal hernia is not present with valsalva              Testicles are normal though there is subtle swelling bilaterally     Assesment: no inguinal hernia clinically evident.     Plan: The nature of hernias, anatomic considerations, indications and approaches to repair were discussed. I relayed that some of his symptoms sounded consistent with a hernia but I did not detect one on exam and the site of swelling would imply an ilioscrotal hernia or communicating hydrocele, " the former of which was not evident. I've offered and recommended a CT scan done with Valsalva to better define the problem. I will phone him with the results when completed.        Jose Solares MD

## 2022-07-08 PROBLEM — E66.01 MORBID OBESITY (H): Status: ACTIVE | Noted: 2022-07-08

## 2022-07-09 ENCOUNTER — HOSPITAL ENCOUNTER (OUTPATIENT)
Dept: CT IMAGING | Facility: CLINIC | Age: 62
Discharge: HOME | End: 2022-07-09
Attending: SURGERY | Admitting: SURGERY
Payer: COMMERCIAL

## 2022-07-09 DIAGNOSIS — R10.31 RIGHT GROIN PAIN: ICD-10-CM

## 2022-07-09 LAB
CREAT BLD-MCNC: 0.8 MG/DL (ref 0.7–1.3)
GFR SERPL CREATININE-BSD FRML MDRD: >60 ML/MIN/1.73M2

## 2022-07-09 PROCEDURE — 250N000011 HC RX IP 250 OP 636: Performed by: SURGERY

## 2022-07-09 PROCEDURE — 74177 CT ABD & PELVIS W/CONTRAST: CPT

## 2022-07-09 PROCEDURE — 82565 ASSAY OF CREATININE: CPT

## 2022-07-09 RX ORDER — IOPAMIDOL 755 MG/ML
90 INJECTION, SOLUTION INTRAVASCULAR ONCE
Status: COMPLETED | OUTPATIENT
Start: 2022-07-09 | End: 2022-07-09

## 2022-07-09 RX ADMIN — IOPAMIDOL 90 ML: 755 INJECTION, SOLUTION INTRAVENOUS at 09:51

## 2022-07-11 ENCOUNTER — TELEPHONE (OUTPATIENT)
Dept: SURGERY | Facility: CLINIC | Age: 62
End: 2022-07-11

## 2022-07-11 ENCOUNTER — PREP FOR PROCEDURE (OUTPATIENT)
Dept: SURGERY | Facility: CLINIC | Age: 62
End: 2022-07-11

## 2022-07-11 DIAGNOSIS — K40.90 RIGHT INGUINAL HERNIA: Primary | ICD-10-CM

## 2022-07-11 NOTE — LETTER
[facility]    [address], [address]  [place], [place] [address]  Phone [phone]  Fax [phone] [address], [address]  [place] [address]  Phone [phone]  Fax [phone]    [email or web address]   July 11, 2022    Saulo Riley  [address] [facility] [address]    We realize with scheduling surgery, one of your first questions is, how much will this cost?  Below we have provided you with the information you will need to receive an estimate for your surgery.    You are scheduled for the following procedure:  OPEN REPAIR RIGHT INGUINAL HERNIA WITH MESH     Surgeon:  LEONARD RAINEY MD       Physician Assistant:  Yes      Please make sure to have your insurance card available at the time of calling.    Surgeon & Physician Assistant charges and facility charges for [facility], [facility] or [facility]:    Consumer Price Line at [phone]   -  It is important to note that there may be a Physician Assistant assisting with your surgery.  Please be sure to mention this when calling for the estimate.      If you prefer, you can also request a price estimate online by completing the secure form at:  [email or web address]    Facility Charges at [facility], [facility] or [facility]:  [facility] at [phone]  [facility] at [phone]  [facility] at [phone] or [phone]    Anesthesiologist Charges:  [facility] at [phone]    CRNA - Nurse Anesthetist Charges:  [facility] at [phone]

## 2022-07-11 NOTE — TELEPHONE ENCOUNTER
Type of surgery: OPEN REPAIR RIGHT INGUINAL HERNIA WITH MESH  Location of surgery: [facility]  Date and time of surgery: 8/1/2022 @ 10:45  Surgeon: LEONARD RAINEY MD    Pre-Op Appt Date: PATIENT TO SCHEDULE    Post-Op Appt Date: PATIENT TO SCHEDULE     Packet sent out: Yes  Pre-cert/Authorization completed:  Not Applicable  Date: 7/11/2022       OPEN REPAIR RIGHT INGUINAL HERNIA WITH MESH     GENERAL PT INST TO HAVE H&P WITH DR MUJICA 75 MIN REQ PA ASSIST RMO NMS

## 2022-07-11 NOTE — RESULT ENCOUNTER NOTE
Patient was phoned by me with result.  CT reviewed, agree there is a fat-containing hernia on the right. We will schedule surgery with him per his request.

## 2022-07-11 NOTE — LETTER
"          [facility]    [address], [address]  [place], [place] [address]  Phone [phone]  Fax [phone]    [address], [address]  [facility] [place] [address]  Phone [phone]  Fax [phone]    [email or web address]   Saulo Riley     You have been scheduled for a COVID-19 testing appointment at [facility].    8/8/2022 at 8:15 AM        [facility]:  [address] [place]  [address]    Park in the Emergency Department parking lot and enter building through the Emergency Department entrance.  Watch for the \"Covid-19 Collection Lab\" sign inside the entry way and press the doorbell on the sign. This will open the doors on the right side into the Covid Collection Lab.  All patients must have a mask upon entry into the hospital.  Please do not arrive prior to your appointment time to ensure proper social distancing.    Please wear a mask or face covering to the testing site.      Following your testing, you will be required to self-isolate until your surgery.  If you need a note for your employer due to this, please let us know.      "

## 2022-08-08 ENCOUNTER — LAB (OUTPATIENT)
Dept: LAB | Facility: CLINIC | Age: 62
End: 2022-08-08
Payer: COMMERCIAL

## 2022-08-08 DIAGNOSIS — K40.90 RIGHT INGUINAL HERNIA: Primary | ICD-10-CM

## 2022-08-08 DIAGNOSIS — K40.90 RIGHT INGUINAL HERNIA: ICD-10-CM

## 2022-08-08 LAB — SARS-COV-2 RNA RESP QL NAA+PROBE: NEGATIVE

## 2022-08-08 PROCEDURE — U0005 INFEC AGEN DETEC AMPLI PROBE: HCPCS

## 2022-08-11 ENCOUNTER — HOSPITAL ENCOUNTER (OUTPATIENT)
Facility: CLINIC | Age: 62
Discharge: HOME | End: 2022-08-11
Attending: SURGERY | Admitting: SURGERY
Payer: COMMERCIAL

## 2022-08-11 ENCOUNTER — ANESTHESIA (OUTPATIENT)
Dept: SURGERY | Facility: CLINIC | Age: 62
End: 2022-08-11
Payer: COMMERCIAL

## 2022-08-11 ENCOUNTER — ANESTHESIA EVENT (OUTPATIENT)
Dept: SURGERY | Facility: CLINIC | Age: 62
End: 2022-08-11
Payer: COMMERCIAL

## 2022-08-11 VITALS
BODY MASS INDEX: 42.65 KG/M2 | RESPIRATION RATE: 16 BRPM | TEMPERATURE: 97.6 F | OXYGEN SATURATION: 96 % | WEIGHT: 288 LBS | HEIGHT: 69 IN | DIASTOLIC BLOOD PRESSURE: 87 MMHG | HEART RATE: 77 BPM | SYSTOLIC BLOOD PRESSURE: 140 MMHG

## 2022-08-11 DIAGNOSIS — K40.90 RIGHT INGUINAL HERNIA: Primary | ICD-10-CM

## 2022-08-11 LAB
GLUCOSE BLDC GLUCOMTR-MCNC: 115 MG/DL (ref 70–99)
GLUCOSE BLDC GLUCOMTR-MCNC: 150 MG/DL (ref 70–99)

## 2022-08-11 PROCEDURE — 258N000003 HC RX IP 258 OP 636: Performed by: ANESTHESIOLOGY

## 2022-08-11 PROCEDURE — 710N000012 HC RECOVERY PHASE 2, PER MINUTE: Performed by: SURGERY

## 2022-08-11 PROCEDURE — 999N000141 HC STATISTIC PRE-PROCEDURE NURSING ASSESSMENT: Performed by: SURGERY

## 2022-08-11 PROCEDURE — 360N000075 HC SURGERY LEVEL 2, PER MIN: Performed by: SURGERY

## 2022-08-11 PROCEDURE — C1781 MESH (IMPLANTABLE): HCPCS | Performed by: SURGERY

## 2022-08-11 PROCEDURE — 250N000009 HC RX 250: Performed by: SURGERY

## 2022-08-11 PROCEDURE — 49505 PRP I/HERN INIT REDUC >5 YR: CPT | Mod: RT | Performed by: SURGERY

## 2022-08-11 PROCEDURE — 250N000011 HC RX IP 250 OP 636: Performed by: NURSE ANESTHETIST, CERTIFIED REGISTERED

## 2022-08-11 PROCEDURE — 250N000009 HC RX 250: Performed by: NURSE ANESTHETIST, CERTIFIED REGISTERED

## 2022-08-11 PROCEDURE — 250N000011 HC RX IP 250 OP 636: Performed by: SURGERY

## 2022-08-11 PROCEDURE — 250N000011 HC RX IP 250 OP 636: Performed by: ANESTHESIOLOGY

## 2022-08-11 PROCEDURE — 710N000009 HC RECOVERY PHASE 1, LEVEL 1, PER MIN: Performed by: SURGERY

## 2022-08-11 PROCEDURE — 258N000003 HC RX IP 258 OP 636: Performed by: NURSE ANESTHETIST, CERTIFIED REGISTERED

## 2022-08-11 PROCEDURE — 49505 PRP I/HERN INIT REDUC >5 YR: CPT | Mod: AS | Performed by: PHYSICIAN ASSISTANT

## 2022-08-11 PROCEDURE — 370N000017 HC ANESTHESIA TECHNICAL FEE, PER MIN: Performed by: SURGERY

## 2022-08-11 PROCEDURE — 272N000001 HC OR GENERAL SUPPLY STERILE: Performed by: SURGERY

## 2022-08-11 PROCEDURE — 82962 GLUCOSE BLOOD TEST: CPT

## 2022-08-11 PROCEDURE — 250N000009 HC RX 250: Performed by: ANESTHESIOLOGY

## 2022-08-11 DEVICE — MESH ULTRAPRO 03X06": Type: IMPLANTABLE DEVICE | Site: INGUINAL | Status: FUNCTIONAL

## 2022-08-11 RX ORDER — OXYCODONE HYDROCHLORIDE 5 MG/1
5 TABLET ORAL EVERY 4 HOURS PRN
Status: DISCONTINUED | OUTPATIENT
Start: 2022-08-11 | End: 2022-08-11 | Stop reason: HOSPADM

## 2022-08-11 RX ORDER — SODIUM CHLORIDE, SODIUM LACTATE, POTASSIUM CHLORIDE, CALCIUM CHLORIDE 600; 310; 30; 20 MG/100ML; MG/100ML; MG/100ML; MG/100ML
INJECTION, SOLUTION INTRAVENOUS CONTINUOUS
Status: DISCONTINUED | OUTPATIENT
Start: 2022-08-11 | End: 2022-08-11 | Stop reason: HOSPADM

## 2022-08-11 RX ORDER — PHENYLEPHRINE HYDROCHLORIDE 10 MG/ML
INJECTION INTRAVENOUS PRN
Status: DISCONTINUED | OUTPATIENT
Start: 2022-08-11 | End: 2022-08-11

## 2022-08-11 RX ORDER — ONDANSETRON 2 MG/ML
4 INJECTION INTRAMUSCULAR; INTRAVENOUS EVERY 30 MIN PRN
Status: DISCONTINUED | OUTPATIENT
Start: 2022-08-11 | End: 2022-08-11 | Stop reason: HOSPADM

## 2022-08-11 RX ORDER — FENTANYL CITRATE 50 UG/ML
25 INJECTION, SOLUTION INTRAMUSCULAR; INTRAVENOUS
Status: DISCONTINUED | OUTPATIENT
Start: 2022-08-11 | End: 2022-08-11 | Stop reason: HOSPADM

## 2022-08-11 RX ORDER — ONDANSETRON 4 MG/1
4 TABLET, ORALLY DISINTEGRATING ORAL EVERY 30 MIN PRN
Status: DISCONTINUED | OUTPATIENT
Start: 2022-08-11 | End: 2022-08-11 | Stop reason: HOSPADM

## 2022-08-11 RX ORDER — BUPIVACAINE HYDROCHLORIDE 5 MG/ML
INJECTION, SOLUTION EPIDURAL; INTRACAUDAL PRN
Status: DISCONTINUED | OUTPATIENT
Start: 2022-08-11 | End: 2022-08-11 | Stop reason: HOSPADM

## 2022-08-11 RX ORDER — FENTANYL CITRATE 50 UG/ML
INJECTION, SOLUTION INTRAMUSCULAR; INTRAVENOUS PRN
Status: DISCONTINUED | OUTPATIENT
Start: 2022-08-11 | End: 2022-08-11

## 2022-08-11 RX ORDER — HYDROCODONE BITARTRATE AND ACETAMINOPHEN 5; 325 MG/1; MG/1
1 TABLET ORAL
Status: DISCONTINUED | OUTPATIENT
Start: 2022-08-11 | End: 2022-08-11 | Stop reason: HOSPADM

## 2022-08-11 RX ORDER — CEFAZOLIN SODIUM/WATER 3 G/30 ML
3 SYRINGE (ML) INTRAVENOUS SEE ADMIN INSTRUCTIONS
Status: DISCONTINUED | OUTPATIENT
Start: 2022-08-11 | End: 2022-08-11 | Stop reason: HOSPADM

## 2022-08-11 RX ORDER — MEPERIDINE HYDROCHLORIDE 25 MG/ML
12.5 INJECTION INTRAMUSCULAR; INTRAVENOUS; SUBCUTANEOUS
Status: DISCONTINUED | OUTPATIENT
Start: 2022-08-11 | End: 2022-08-11 | Stop reason: HOSPADM

## 2022-08-11 RX ORDER — GLYCOPYRROLATE 0.2 MG/ML
INJECTION, SOLUTION INTRAMUSCULAR; INTRAVENOUS PRN
Status: DISCONTINUED | OUTPATIENT
Start: 2022-08-11 | End: 2022-08-11

## 2022-08-11 RX ORDER — ONDANSETRON 2 MG/ML
INJECTION INTRAMUSCULAR; INTRAVENOUS PRN
Status: DISCONTINUED | OUTPATIENT
Start: 2022-08-11 | End: 2022-08-11

## 2022-08-11 RX ORDER — SODIUM CHLORIDE, SODIUM LACTATE, POTASSIUM CHLORIDE, CALCIUM CHLORIDE 600; 310; 30; 20 MG/100ML; MG/100ML; MG/100ML; MG/100ML
INJECTION, SOLUTION INTRAVENOUS CONTINUOUS PRN
Status: DISCONTINUED | OUTPATIENT
Start: 2022-08-11 | End: 2022-08-11

## 2022-08-11 RX ORDER — FENTANYL CITRATE 50 UG/ML
25 INJECTION, SOLUTION INTRAMUSCULAR; INTRAVENOUS EVERY 5 MIN PRN
Status: DISCONTINUED | OUTPATIENT
Start: 2022-08-11 | End: 2022-08-11 | Stop reason: HOSPADM

## 2022-08-11 RX ORDER — HYDROMORPHONE HCL IN WATER/PF 6 MG/30 ML
0.2 PATIENT CONTROLLED ANALGESIA SYRINGE INTRAVENOUS EVERY 5 MIN PRN
Status: DISCONTINUED | OUTPATIENT
Start: 2022-08-11 | End: 2022-08-11 | Stop reason: HOSPADM

## 2022-08-11 RX ORDER — HYDROCODONE BITARTRATE AND ACETAMINOPHEN 5; 325 MG/1; MG/1
1-2 TABLET ORAL EVERY 4 HOURS PRN
Qty: 10 TABLET | Refills: 0 | Status: ON HOLD | OUTPATIENT
Start: 2022-08-11 | End: 2022-10-27

## 2022-08-11 RX ORDER — CEFAZOLIN SODIUM/WATER 3 G/30 ML
3 SYRINGE (ML) INTRAVENOUS
Status: COMPLETED | OUTPATIENT
Start: 2022-08-11 | End: 2022-08-11

## 2022-08-11 RX ORDER — LIDOCAINE 40 MG/G
CREAM TOPICAL
Status: DISCONTINUED | OUTPATIENT
Start: 2022-08-11 | End: 2022-08-11 | Stop reason: HOSPADM

## 2022-08-11 RX ORDER — PROPOFOL 10 MG/ML
INJECTION, EMULSION INTRAVENOUS PRN
Status: DISCONTINUED | OUTPATIENT
Start: 2022-08-11 | End: 2022-08-11

## 2022-08-11 RX ADMIN — Medication 140 MG: at 11:33

## 2022-08-11 RX ADMIN — ONDANSETRON HYDROCHLORIDE 4 MG: 2 INJECTION, SOLUTION INTRAVENOUS at 12:31

## 2022-08-11 RX ADMIN — MIDAZOLAM 2 MG: 1 INJECTION INTRAMUSCULAR; INTRAVENOUS at 11:25

## 2022-08-11 RX ADMIN — PHENYLEPHRINE HYDROCHLORIDE 50 MCG: 10 INJECTION INTRAVENOUS at 12:28

## 2022-08-11 RX ADMIN — PHENYLEPHRINE HYDROCHLORIDE 100 MCG: 10 INJECTION INTRAVENOUS at 12:15

## 2022-08-11 RX ADMIN — FENTANYL CITRATE 100 MCG: 50 INJECTION, SOLUTION INTRAMUSCULAR; INTRAVENOUS at 11:33

## 2022-08-11 RX ADMIN — SODIUM CHLORIDE, POTASSIUM CHLORIDE, SODIUM LACTATE AND CALCIUM CHLORIDE: 600; 310; 30; 20 INJECTION, SOLUTION INTRAVENOUS at 10:40

## 2022-08-11 RX ADMIN — ROCURONIUM BROMIDE 30 MG: 50 INJECTION, SOLUTION INTRAVENOUS at 11:39

## 2022-08-11 RX ADMIN — GLYCOPYRROLATE 0.2 MG: 0.2 INJECTION, SOLUTION INTRAMUSCULAR; INTRAVENOUS at 11:33

## 2022-08-11 RX ADMIN — ROCURONIUM BROMIDE 10 MG: 50 INJECTION, SOLUTION INTRAVENOUS at 11:33

## 2022-08-11 RX ADMIN — FENTANYL CITRATE 25 MCG: 50 INJECTION, SOLUTION INTRAMUSCULAR; INTRAVENOUS at 14:16

## 2022-08-11 RX ADMIN — LIDOCAINE HYDROCHLORIDE 50 MG: 10 INJECTION, SOLUTION EPIDURAL; INFILTRATION; INTRACAUDAL; PERINEURAL at 11:33

## 2022-08-11 RX ADMIN — FENTANYL CITRATE 25 MCG: 50 INJECTION, SOLUTION INTRAMUSCULAR; INTRAVENOUS at 14:41

## 2022-08-11 RX ADMIN — SUGAMMADEX 500 MG: 100 INJECTION, SOLUTION INTRAVENOUS at 13:05

## 2022-08-11 RX ADMIN — FENTANYL CITRATE 25 MCG: 50 INJECTION, SOLUTION INTRAMUSCULAR; INTRAVENOUS at 14:30

## 2022-08-11 RX ADMIN — HYDROMORPHONE HYDROCHLORIDE 1 MG: 1 INJECTION, SOLUTION INTRAMUSCULAR; INTRAVENOUS; SUBCUTANEOUS at 12:04

## 2022-08-11 RX ADMIN — FENTANYL CITRATE 25 MCG: 50 INJECTION, SOLUTION INTRAMUSCULAR; INTRAVENOUS at 14:21

## 2022-08-11 RX ADMIN — Medication 3 G: at 11:25

## 2022-08-11 RX ADMIN — ROCURONIUM BROMIDE 10 MG: 50 INJECTION, SOLUTION INTRAVENOUS at 12:31

## 2022-08-11 RX ADMIN — PROPOFOL 200 MG: 10 INJECTION, EMULSION INTRAVENOUS at 11:33

## 2022-08-11 RX ADMIN — SODIUM CHLORIDE, POTASSIUM CHLORIDE, SODIUM LACTATE AND CALCIUM CHLORIDE: 600; 310; 30; 20 INJECTION, SOLUTION INTRAVENOUS at 11:25

## 2022-08-11 ASSESSMENT — ACTIVITIES OF DAILY LIVING (ADL)
ADLS_ACUITY_SCORE: 35

## 2022-08-11 ASSESSMENT — ENCOUNTER SYMPTOMS
DYSRHYTHMIAS: 0
SEIZURES: 1

## 2022-08-11 NOTE — DISCHARGE INSTRUCTIONS
HOME CARE FOLLOWING INGUINAL/FEMORAL HERNIA REPAIR  BEATRIZ Montelongo, TAMMY Velazquez, EDUARDO Torres, GURPREET Handy    DIET:  Start with liquids and gradually resume your regular diet as tolerated.  Drink plenty of fluids.  While taking pain medications, consider use of a stool softener, increase your fiber in your diet, or add a fiber supplement (like Metamucil, Citrucel) to help prevent constipation - a possible side effect of pain medications.    NAUSEA:  If nauseated from the anesthetic/pain meds; rest in bed, get up cautiously with assistance, and drink clear liquids (juice, tea, broth).    ACTIVITY:  Light Activity -- you may immediately be up and about as tolerated.  Walking is encouraged, increase as tolerated.  Driving/Light Work-- when comfortable and off narcotic pain medications.  Strenuous Work/Activity -- limit lifting to 20 pounds for 3 weeks.  Active Sports (running, biking, etc.) -- cautiously resume after 4 weeks.    INCISIONAL CARE:  If you have a dressing in place, keep clean and dry for 48 hours; you may replace the gauze if it becomes soiled.  After 48 hours you may remove the dressing and shower.  Do not submerse incision in water for 1 week.  If you have a Dermabond dressing (a type of skin glue), you may shower immediately.  Sutures will absorb and need not be removed.  If present, leave the steri-strips (white paper tapes) in place for 14 days after surgery.  If present, leave Dermabond glue in place until it wears/flakes off.  Do not apply lotions, creams, or ointments to incisions.  Expect a variable amount of swelling/black and blue discoloration that may involve the penis/scrotum or labia.  Some numbness around the incision is common.  A lump/ridge under the incision is normal and will gradually resolve.    DISCOMFORT:  Local anesthetic placed at surgery should provide relief for 4-8 hours.  Begin taking pain pills before discomfort is severe.  Take the pain medication  with some food, when possible, to minimize side effects.  Intermittent use of ice packs to the hernia repair site may help during the first 1-3 weeks after surgery.  Expect gradual improvement.    Over-the-counter anti-inflammatory medications (i.e. Ibuprofen/Advil/Motrin or Naprosyn/Aleve) may be used per package instructions in addition to or while tapering off the narcotic pain medications to decrease swelling and sensitivity at the repair site.  DO NOT TAKE these Anti-inflammatory medications if your primary physician has advised against doing so, or if you have acid reflux, ulcer, or bleeding disorder, or take blood-thinner medications.  Call your primary physician or the surgery office if you have medication questions.    FOLLOW-UP AFTER SURGERY:  -Our office will contact you approximately 2-3 weeks after surgery to check on your progress and answer any questions you may have.  If you are doing well, you will not need to return for an office appointment.  If any concerns are identified over the phone, we will help you make an appointment to see a provider.    -If you have not received a phone call, have any questions or concerns, or would like to be seen, please call us at [phone].  We are located at: [address], [address]; [place] [address]    -CONTACT US IF THE FOLLOWING DEVELOPS:   1. A fever that is above 101     2. Increased redness, warmth, drainage, bleeding, or swelling.   3. Pain that is not relieved by rest/ice and your prescription.   4.  Increasing pain after 48 hours.   5. Drainage that is thick, cloudy, yellow, green or white.   6. Any other questions or concerns.      FREQUENTLY ASKED QUESTIONS:    Q:  How should my incision look?    A:  Normally your incision will appear slightly swollen with light redness directly along the incision itself as it heals.  It may feel like a bump or ridge as the healing/scarring happens, and over time (3-4 months) this bump or ridge feeling  should slowly go away.  In general, clear or pink watery drainage can be normal at first as your incision heals, but should decrease over time.    Q:  How do I know if my incision is infected?  A:  Look at your incision for signs of infection, like redness around the incision spreading to surrounding skin, or drainage of cloudy or foul-smelling drainage.  If you feel warm, check your temperature to see if you are running a fever.    **If any of these things occur, please notify the nurse at our office.  We may need you to come into the office for an incision check.      Q:  How do I take care of my incision?  A:  If you have a dressing in place - Starting the day after surgery, replace the dressing 1-2 times a day until there is no further drainage from the incision.  At that time, a dressing is no longer needed.  Try to minimize tape on the skin if irritation is occurring at the tape sites.  If you have significant irritation from tape on the skin, please call the office to discuss other method of dressing your incision.    Small pieces of tape called  steri-strips  may be present directly overlying your incision; these may be removed 10 days after surgery unless otherwise specified by your surgeon.  If these tapes start to loosen at the ends, you may trim them back until they fall off or are removed.    A:  If you had  Dermabond  tissue glue used as a dressing (this causes your incision to look shiny with a clear covering over it) - This type of dressing wears off with time and does not require more dressings over the top unless it is draining around the glue as it wears off.  Do not apply ointments or lotions over the incisions until the glue has completely worn off.    Q:  There is a piece of tape or a sticky  lead  still on my skin.  Can I remove this?  A:  Sometimes the sticky  leads  used for monitoring during surgery or for evaluation in the emergency department are not all removed while you are in the  hospital.  These sometimes have a tab or metal dot on them.  You can easily remove these on your own, like taking off a band-aid.  If there is a gel substance under the  lead , simply wipe/clean it off with a washcloth or paper towel.      Q:  What can I do to minimize constipation (very hard stools, or lack of stools)?  A:  Stay well hydrated.  Increase your dietary fiber intake or take a fiber supplement -with plenty of water.  Walk around frequently.  You may consider an over-the-counter stool-softener.  Your Pharmacist can assist you with choosing one that is stocked at your pharmacy.  Constipation is also one of the most common side effects of pain medication.  If you are using pain medication, be pro-active and try to PREVENT problems with constipation by taking the steps above BEFORE constipation becomes a problem.    Q:  What do I do if I need more pain medications?  A:  Call the office to receive refills.  Be aware that certain pain meds cannot be called into a pharmacy and actually require a paper prescription.  A change may be made in your pain med as you progress thru your recovery period or if you have side effects to certain meds.    --Pain meds are NOT refilled after 5pm on weekdays, and NOT AT ALL on the weekends, so please look ahead to prevent problems.    Q:  Why am I having a hard time sleeping now that I am at home?  A:  Many medications you receive while you are in the hospital can impact your sleep for a number of days after your surgery/hospitalization.  Decreased level of activity and naps during the day may also make sleeping at night difficult.  Try to minimize day-time naps, and get up frequently during the day to walk around your home during your recovery time.  Sleep aides may be of some help, but are not recommended for long-term use.      Q:  I am having some back discomfort.  What should I do?  A:  This may be related to certain positioning that was required for your surgery,  extended periods of time in bed, or other changes in your overall activity level.  You may try ice, heat, acetaminophen, or ibuprofen to treat this temporarily.  Note that many pain medications have acetaminophen in them and would state this on the prescription bottle.  Be sure not to exceed the maximum of 4000mg per day of acetaminophen.     **If the pain you are having does not resolve, is severe, or is a flare of back pain you have had on other occasions prior to surgery, please contact your primary physician for further recommendations or for an appointment to be examined at their office.    Q:  Why am I having headaches?  A:  Headaches can be caused by many things:  caffeine withdrawal, use of pain meds, dehydration, high blood pressure, lack of sleep, over-activity/exhaustion, flare-up of usual migraine headaches.  If you feel this is related to muscle tension (a band-like feeling around the head, or a pressure at the low-back of the head) you may try ice or heat to this area.  You may need to drink more fluids (try electrolyte drink like Gatorade), rest, or take your usual migraine medications.   **If your headaches do not resolve, worsen, are accompanied by other symptoms, or if your blood pressure is high, please call your primary physician for recommendation and/or examination.    Q:  I am unable to urinate.  What do I do?  A:  A small percentage of people can have difficulty urinating initially after surgery.  This includes being able to urinate only a very small amount at a time and feeling discomfort or pressure in the very low abdomen.  This is called  urinary retention , and is actually an urgent situation.  Proceed to your nearest Emergency department for evaluation (not an Urgent Care Center).  Sometimes the bladder does not work correctly after certain medications you receive during surgery, or related to certain procedures.  You may need to have a catheter placed until your bladder recovers.  When  planning to go to an Emergency department, it may help to call the ER to let them know you are coming in for this problem after a surgery.  This may help you get in quicker to be evaluated.  **If you have symptoms of a urinary tract infection, please contact your primary physician for the proper evaluation and treatment.        If you have other questions, please call the office Monday thru Friday between 8am and 4:30pm to discuss with the nurse or physician assistant.  #[phone]    There is a surgeon ON CALL on weekday evenings and over the weekend in case of urgent need only, and may be contacted at the same number.    If you are having an emergency, call 911 or proceed to your nearest emergency department.    GENERAL ANESTHESIA OR SEDATION ADULT DISCHARGE INSTRUCTIONS   SPECIAL PRECAUTIONS FOR 24 HOURS AFTER SURGERY    IT IS NOT UNUSUAL TO FEEL LIGHT-HEADED OR FAINT, UP TO 24 HOURS AFTER SURGERY OR WHILE TAKING PAIN MEDICATION.  IF YOU HAVE THESE SYMPTOMS; SIT FOR A FEW MINUTES BEFORE STANDING AND HAVE SOMEONE ASSIST YOU WHEN YOU GET UP TO WALK OR USE THE BATHROOM.    YOU SHOULD REST AND RELAX FOR THE NEXT 24 HOURS AND YOU MUST MAKE ARRANGEMENTS TO HAVE SOMEONE STAY WITH YOU FOR AT LEAST 24 HOURS AFTER YOUR DISCHARGE.  AVOID HAZARDOUS AND STRENUOUS ACTIVITIES.  DO NOT MAKE IMPORTANT DECISIONS FOR 24 HOURS.    DO NOT DRIVE ANY VEHICLE OR OPERATE MECHANICAL EQUIPMENT FOR 24 HOURS FOLLOWING THE END OF YOUR SURGERY.  EVEN THOUGH YOU MAY FEEL NORMAL, YOUR REACTIONS MAY BE AFFECTED BY THE MEDICATION YOU HAVE RECEIVED.    DO NOT DRINK ALCOHOLIC BEVERAGES FOR 24 HOURS FOLLOWING YOUR SURGERY.    DRINK CLEAR LIQUIDS (APPLE JUICE, GINGER ALE, 7-UP, BROTH, ETC.).  PROGRESS TO YOUR REGULAR DIET AS YOU FEEL ABLE.    YOU MAY HAVE A DRY MOUTH, A SORE THROAT, MUSCLES ACHES OR TROUBLE SLEEPING.  THESE SHOULD GO AWAY AFTER 24 HOURS.    CALL YOUR DOCTOR FOR ANY OF THE FOLLOWING:  SIGNS OF INFECTION (FEVER, GROWING TENDERNESS  AT THE SURGERY SITE, A LARGE AMOUNT OF DRAINAGE OR BLEEDING, SEVERE PAIN, FOUL-SMELLING DRAINAGE, REDNESS OR SWELLING.    IT HAS BEEN OVER 8 TO 10 HOURS SINCE SURGERY AND YOU ARE STILL NOT ABLE TO URINATE (PASS WATER).      Maximum acetaminophen (Tylenol) dose from all sources should not exceed 4 grams (4000 mg) per

## 2022-08-11 NOTE — ANESTHESIA CARE TRANSFER NOTE
Patient: Saulo Riley    Procedure: Procedure(s):  RIGHT OPEN INGUINAL HERNIA REPAIR WITH MESH       Diagnosis: Right inguinal hernia [K40.90]  Diagnosis Additional Information: No value filed.    Anesthesia Type:   General     Note:    Oropharynx: oropharynx clear of all foreign objects and spontaneously breathing  Level of Consciousness: awake  Oxygen Supplementation: face mask    Independent Airway: airway patency satisfactory and stable  Dentition: dentition unchanged  Vital Signs Stable: post-procedure vital signs reviewed and stable  Report to RN Given: handoff report given  Patient transferred to: PACU  Comments: Report and signed off to RN in PACU.  Good Resps, skin pink, VSS, O2 via face tent.  Handoff Report: Identifed the Patient, Identified the Reponsible Provider, Reviewed the pertinent medical history, Discussed the surgical course, Reviewed Intra-OP anesthesia mangement and issues during anesthesia, Set expectations for post-procedure period and Allowed opportunity for questions and acknowledgement of understanding      Vitals:  Vitals Value Taken Time   /92 08/11/22 1315   Temp 97.2  F (36.2  C) 08/11/22 1315   Pulse 87 08/11/22 1319   Resp 18 08/11/22 1319   SpO2 100 % 08/11/22 1319   Vitals shown include unvalidated device data.    Electronically Signed By: HOLLY Rowland CRNA  August 11, 2022  1:21 PM

## 2022-08-11 NOTE — ANESTHESIA PROCEDURE NOTES
Airway       Patient location during procedure: OR       Procedure Start/Stop Times: 8/11/2022 11:37 AM  Staff -        CRNA: Oralia Newman APRN CRNA       Performed By: CRNA  Consent for Airway        Urgency: elective  Indications and Patient Condition       Indications for airway management: dawood-procedural       Induction type:intravenous       Mask difficulty assessment: 1 - vent by mask    Final Airway Details       Final airway type: endotracheal airway       Successful airway: ETT - single and Oral  Endotracheal Airway Details        ETT size (mm): 8.0       Cuffed: yes       Successful intubation technique: video laryngoscopy       VL Blade Size: Glidescope 3       Grade View of Cords: 1       Adjucts: stylet       Position: Right       Measured from: gums/teeth       Secured at (cm): 23       Bite block used: Soft    Post intubation assessment        Placement verified by: capnometry, equal breath sounds and chest rise        Number of attempts at approach: 1       Number of other approaches attempted: 0       Secured with: plastic tape       Ease of procedure: easy       Dentition: Intact and Unchanged    Medication(s) Administered   Medication Administration Time: 8/11/2022 11:37 AM

## 2022-08-11 NOTE — ANESTHESIA PREPROCEDURE EVALUATION
Anesthesia Pre-Procedure Evaluation    Patient: Saulo Riley   MRN: [id number] : 1960        Procedure : Procedure(s):  HERNIORRHAPHY, INGUINAL, OPEN WITH MESH - Right          Past Medical History:   Diagnosis Date     Diabetes mellitus (H)      Hypertension      Seizures (H)      Stroke (H)           Past Surgical History:   Procedure Laterality Date     BACK SURGERY       HYDROCELECTOMY SCROTAL       HYDROCELECTOMY SCROTAL N/A 2020    Procedure: HYDROCELECTOMY;  Surgeon: Eligio Díaz MD;  Location: [facility];  Service: Urology     NECK SURGERY       TONSILLECTOMY        No Known Allergies   Social History     Tobacco Use     Smoking status: Never Smoker     Smokeless tobacco: Never Used   Substance Use Topics     Alcohol use: Yes     Comment: 1 beer every 6 month      Wt Readings from Last 1 Encounters:   22 130.6 kg (288 lb)        Anesthesia Evaluation            ROS/MED HX  ENT/Pulmonary:  - neg pulmonary ROS     Neurologic:     (+) seizures, TIA,     Cardiovascular:     (+) Dyslipidemia hypertension----- (-) CAD, CHF, arrhythmias and pulmonary hypertension   METS/Exercise Tolerance:     Hematologic:  - neg hematologic  ROS     Musculoskeletal:  - neg musculoskeletal ROS     GI/Hepatic:    (-) GERD and hepatitis   Renal/Genitourinary:       Endo:     (+) type II DM, Obesity,     Psychiatric/Substance Use:       Infectious Disease:       Malignancy:       Other:            Physical Exam    Airway        Mallampati: II   TM distance: > 3 FB   Neck ROM: full   Mouth opening: > 3 cm    Respiratory Devices and Support         Dental           Cardiovascular   cardiovascular exam normal          Pulmonary   pulmonary exam normal        breath sounds clear to auscultation           OUTSIDE LABS:  CBC: No results found for: WBC, HGB, HCT, PLT  BMP:   Lab Results   Component Value Date    CR 0.8 2022     (H) 2022     2020     COAGS: No results  found for: PTT, INR, FIBR  POC: No results found for: BGM, HCG, HCGS  HEPATIC: No results found for: ALBUMIN, PROTTOTAL, ALT, AST, GGT, ALKPHOS, BILITOTAL, BILIDIRECT, JOSETTE  OTHER: No results found for: PH, LACT, A1C, VIOLETTA, PHOS, MAG, LIPASE, AMYLASE, TSH, T4, T3, CRP, SED    Anesthesia Plan    ASA Status:  3      Anesthesia Type: General.     - Airway: ETT   Induction: Propofol.   Maintenance: Balanced.   Techniques and Equipment:     - Airway: Video-Laryngoscope         Consents    Anesthesia Plan(s) and associated risks, benefits, and realistic alternatives discussed. Questions answered and patient/representative(s) expressed understanding.    - Discussed:     - Discussed with:  Patient      - Extended Intubation/Ventilatory Support Discussed: No.      - Patient is DNR/DNI Status: No    Use of blood products discussed: No .     Postoperative Care    Pain management: IV analgesics, Oral pain medications.   PONV prophylaxis: Ondansetron (or other 5HT-3), Background Propofol Infusion     Comments:                Erick Schwarz MD

## 2022-08-11 NOTE — ANESTHESIA POSTPROCEDURE EVALUATION
Patient: Saulo Riley    Procedure: Procedure(s):  RIGHT OPEN INGUINAL HERNIA REPAIR WITH MESH       Anesthesia Type:  General    Note:  Disposition: Inpatient   Postop Pain Control: Uneventful            Sign Out: Well controlled pain   PONV: No   Neuro/Psych: Uneventful            Sign Out: Acceptable/Baseline neuro status   Airway/Respiratory: Uneventful            Sign Out: Acceptable/Baseline resp. status   CV/Hemodynamics: Uneventful            Sign Out: Acceptable CV status; No obvious hypovolemia; No obvious fluid overload   Other NRE: NONE   DID A NON-ROUTINE EVENT OCCUR? No           Last vitals:  Vitals Value Taken Time   BP     Temp     Pulse     Resp     SpO2         Electronically Signed By: Erick Schwarz MD  August 11, 2022  1:17 PM

## 2022-08-11 NOTE — ANESTHESIA POSTPROCEDURE EVALUATION
Patient: Saulo Riley    Procedure: Procedure(s):  RIGHT OPEN INGUINAL HERNIA REPAIR WITH MESH       Anesthesia Type:  General    Note:  Disposition: Inpatient   Postop Pain Control: Uneventful            Sign Out: Well controlled pain   PONV: No   Neuro/Psych: Uneventful            Sign Out: Acceptable/Baseline neuro status   Airway/Respiratory: Uneventful            Sign Out: Acceptable/Baseline resp. status   CV/Hemodynamics: Uneventful            Sign Out: Acceptable CV status; No obvious hypovolemia; No obvious fluid overload   Other NRE: NONE   DID A NON-ROUTINE EVENT OCCUR? No           Last vitals:  Vitals Value Taken Time   /88 08/11/22 1445   Temp 97.9  F (36.6  C) 08/11/22 1450   Pulse 83 08/11/22 1457   Resp 16 08/11/22 1457   SpO2 95 % 08/11/22 1457   Vitals shown include unvalidated device data.    Electronically Signed By: Erick Schwarz MD  August 11, 2022  2:57 PM

## 2022-08-11 NOTE — BRIEF OP NOTE
"[facility]    Brief Operative Note    Pre-operative diagnosis: Right inguinal hernia [K40.90]  Post-operative diagnosis Right inguinal hernia    Procedure: Procedure(s):  RIGHT OPEN INGUINAL HERNIA REPAIR WITH MESH  Surgeon: Surgeon(s) and Role:     * Jose Mckee MD - Primary     * Nedra Kramer PA-C - Assisting  Anesthesia: General   Estimated Blood Loss: Less than 10 ml    Drains: None  Specimens: * No specimens in log *  Findings:   Small fat-contiaining direct defect, repaired with onlay mesh..  Complications: None.  Implants:   Implant Name Type Inv. Item Serial No.  Lot No. LRB No. Used Action   MESH ULTRAPRO 03X06\" - [id number] Mesh MESH ULTRAPRO 03X06\"  [employer] [facility]- [id number] Right 1 Implanted           "

## 2022-08-11 NOTE — OP NOTE
General Surgery Operative Note      Pre-operative diagnosis: Right inguinal hernia   Post-operative diagnosis: Direct right inguinal hernia    Procedure: Direct right inguinal hernia repair with Ultrapro mesh in Bryanna fashion    Surgeon: Jose Mckee MD   Assistant(s): Nedra Kramer PA-C  The physician assistant was medically necessary for her skills in suturing, cutting suture, exposure and suctioning throughout the operation.   Anesthesia: General    Estimated blood loss:  Complications: 5 cc  None   Specimens: * No specimens in log *     DESCRIPTION OF PROCEDURE:  The patient was placed on the table in supine position.  General anesthesia was induced and the abdomen was prepped and draped in sterile fashion.  An inguinal incision was made on the right side and was carried down into the subcutaneous tissue.  The superficial epigastric vessels were ligated and divided with 2-0 Vicryl ties.  The external oblique aponeurosis was cleared of subcutaneous tissue.  Local anesthetic was infused under the aponeurosis.  This was incised with a #15 blade.  The incision was carried medially to the level of the external ring with the Metzenbaum scissors.  Each leaflet of the external oblique aponeurosis was grasped with a Jayna clamp.  The undersurface was cleared with a sweeping motion of the surgeon's finger.  The Weitlaner retractor was used to expose the inguinal canal. This allowed for good exposure of the inguinal canal.  We freed up the cord structures from the pubic tubercle bluntly and placed a Penrose drain around them.  This revealed a small direct defect in the floor of the inguinal canal containing fat; this was reduced and the floor imbricated with an 0 PDS in a figure of eight.  We then performed a thorough evaluation of the spermatic cord by dividing the cremasteric muscles at the level of the internal ring.  There was no indirect hernia defect noted.  We then imbricated the floor of the  inguinal canal medial to the epigastric vessels using a running 2-0 PDS suture.  We then brought a 3 X 6 in piece of Ultrapro mesh to the field and cut it to size.  We adjoined the mesh to Milton's ligament medially with a 2-0 PDS suture and ran it along the shelving edge of the inguinal ligament inferiorly with the same 2-0 PDS suture.  The mesh was cut to allow for passage of the spermatic cord at the level of the internal ring.  The superior leaflet of the mesh was then brought to the inguinal ligament and secured in place with the running 2-0 PDS suture.  The superior aspect of the mesh was secured to the conjoined tendon and to the floor of the inguinal canal laterally with interrupted 2-0 PDS sutures.  The mesh overlay the pubic tubercle by 3 cm medially.  Local anesthetic was injected at all suture sites and in the skin and subcutaneous tissue.  We inspected for hemostasis and irrigated with sterile saline.  We closed the external oblique aponeurosis with a 2-0 Vicryl suture in running fashion.  Shilpa's fascia was closed with interrupted 3-0 Vicryl sutures.  The skin was closed with a running 4-0 Vicryl subcuticular suture and skin glue.  The patient tolerated the procedure well.  Sponge and instrument counts were correct at the end of the case.    Jose Mckee MD

## 2022-08-11 NOTE — INTERVAL H&P NOTE
"I have reviewed the surgical (or preoperative) H&P that is linked to this encounter, and examined the patient. There are no significant changes    Clinical Conditions Present on Arrival:  Clinically Significant Risk Factors Present on Admission                   # Severe Obesity: Estimated body mass index is 42.53 kg/m  as calculated from the following:    Height as of this encounter: 1.753 m (5' 9\").    Weight as of this encounter: 130.6 kg (288 lb).       "

## 2022-08-25 ENCOUNTER — TELEPHONE (OUTPATIENT)
Dept: SURGERY | Facility: CLINIC | Age: 62
End: 2022-08-25

## 2022-08-25 NOTE — TELEPHONE ENCOUNTER
Attempted to call patient for post op check.  No answer.  Message was left for patient to call back if they had any questions of concerns.     Nedra Kramer PA-C

## 2022-08-26 ENCOUNTER — TELEPHONE (OUTPATIENT)
Dept: SURGERY | Facility: CLINIC | Age: 62
End: 2022-08-26

## 2022-08-26 NOTE — TELEPHONE ENCOUNTER
"Returned patient call.  Patient c/o persistent R testicle pain that radiates to R leg, resolves when he \"pushes testicle back in\".  Wearing jock strap is somewhat helpful. Symptoms are very similar to preop. Incision is healing well. Would like to see surgeon.    A/P:  Scheduled patient for postop visit with Dr. Solares 9/8/22.    Nedra Kramer PA-C  "

## 2022-09-08 ENCOUNTER — OFFICE VISIT (OUTPATIENT)
Dept: SURGERY | Facility: CLINIC | Age: 62
End: 2022-09-08
Payer: COMMERCIAL

## 2022-09-08 VITALS
HEART RATE: 90 BPM | OXYGEN SATURATION: 96 % | RESPIRATION RATE: 16 BRPM | DIASTOLIC BLOOD PRESSURE: 90 MMHG | BODY MASS INDEX: 42.65 KG/M2 | SYSTOLIC BLOOD PRESSURE: 150 MMHG | WEIGHT: 288 LBS | HEIGHT: 69 IN

## 2022-09-08 DIAGNOSIS — N50.89 SWELLING OF RIGHT HALF OF SCROTUM: Primary | ICD-10-CM

## 2022-09-08 PROCEDURE — 99024 POSTOP FOLLOW-UP VISIT: CPT | Performed by: SURGERY

## 2022-09-08 NOTE — LETTER
2022    RE: Saulo Riley, : 1960      Dear Dr. Herring,     I had the pleasure of seeing Saulo today in follow-up for his right inguinal hernia repair with mesh on 22. We reviewed the operative findings again today which showed a small fat-containing direct hernia. He did well initially but notes that his pre-op symptoms of right testicle pain have recurred and persisted since about two weeks ago. He continues to feel relief when manually pushing the testicle upwards towards the groin.     On exam, the patient's incision is healing well without signs of infection. There is no clinical hernia present. His right testicle seems grossly normal and is non-tender on exam.     Saulo is doing well postoperatively. His pre-op testicular symptoms persist and are therefore unlikely driven by the fact he had a hernia on this same side. I've encouraged him to return to his urologist for any additional treatment.      Sincerely,            Jose Solares MD

## 2022-10-04 ENCOUNTER — OFFICE VISIT (OUTPATIENT)
Dept: ONCOLOGY | Facility: CLINIC | Age: 62
End: 2022-10-04
Attending: STUDENT IN AN ORGANIZED HEALTH CARE EDUCATION/TRAINING PROGRAM
Payer: COMMERCIAL

## 2022-10-04 VITALS
OXYGEN SATURATION: 98 % | DIASTOLIC BLOOD PRESSURE: 77 MMHG | SYSTOLIC BLOOD PRESSURE: 160 MMHG | HEART RATE: 70 BPM | RESPIRATION RATE: 18 BRPM | WEIGHT: 295.8 LBS | BODY MASS INDEX: 43.68 KG/M2

## 2022-10-04 DIAGNOSIS — R10.31 CHRONIC PAIN OF RIGHT INGUINAL REGION: Primary | ICD-10-CM

## 2022-10-04 DIAGNOSIS — G89.29 CHRONIC PAIN OF RIGHT INGUINAL REGION: Primary | ICD-10-CM

## 2022-10-04 PROCEDURE — 64425 NJX AA&/STRD II IH NERVES: CPT

## 2022-10-04 PROCEDURE — 99213 OFFICE O/P EST LOW 20 MIN: CPT | Performed by: STUDENT IN AN ORGANIZED HEALTH CARE EDUCATION/TRAINING PROGRAM

## 2022-10-04 PROCEDURE — G0463 HOSPITAL OUTPT CLINIC VISIT: HCPCS

## 2022-10-04 PROCEDURE — 250N000009 HC RX 250: Performed by: STUDENT IN AN ORGANIZED HEALTH CARE EDUCATION/TRAINING PROGRAM

## 2022-10-04 RX ORDER — LIDOCAINE HYDROCHLORIDE 10 MG/ML
10 INJECTION, SOLUTION EPIDURAL; INFILTRATION; INTRACAUDAL; PERINEURAL ONCE
Status: COMPLETED | OUTPATIENT
Start: 2022-10-04 | End: 2022-10-04

## 2022-10-04 RX ADMIN — LIDOCAINE HYDROCHLORIDE 15 ML: 10 INJECTION, SOLUTION EPIDURAL; INFILTRATION; INTRACAUDAL; PERINEURAL at 16:10

## 2022-10-04 ASSESSMENT — PAIN SCALES - GENERAL: PAINLEVEL: SEVERE PAIN (7)

## 2022-10-04 NOTE — PROGRESS NOTES
CHIEF COMPLAINT   It was my pleasure to see Saulo Riley who is a 62 year old male for follow-up of right testicular pain.      HPI:  Saulo Riley is a 62 year old male being seen for follow-up after hernia surgery.  Duration of problem: Few years  Previous treatments: Right hydrocelectomy and right direct inguinal hernia repair    Thomas comes today after about 6 weeks of hernia surgery  He feels that he is pain is still persistent on the right testis and has not improved significantly even after the hernia surgery  He still has to push the testis higher up into the scrotum towards the inguinal ring so that the pain gets better  He additionally has pain along the inner thigh which limits his sleep mobility and his ability to work  He is very concerned and distressed because of this pain and wants immediate care in relief from the same and if at all any procedure that is needed as he has met his deductible and wants to do any procedure if necessary within this year    Exam:  BP (!) 160/77   Pulse 70   Resp 18   Wt 134.2 kg (295 lb 12.8 oz)   SpO2 98%   BMI 43.68 kg/m    General: age-appropriate appearing male in NAD sitting in an exam chair  Resp: no respiratory distress  CV: heart rate regular  Abdomen: Degree of obesity is severe. Abdomen is soft and nontender. No organomegaly. .  Well-healed hernia scar  : Bilateral testis feels normal right cord structures are thickened as compared to the left  Neuro: grossly non focal. Normal reflexes  Motor: excellent strength throughout    Review of Imaging:  The following imaging exams were independently viewed and interpreted by me and discussed with patient:      Review of Labs:  The following labs were reviewed by me and discussed with the patient:    Assessment & Plan     Chronic pain of right inguinal region  Chronic orchialgia with radiating pain along the right inner thigh  Discussed with him about the fact that we could try and do cord block with lignocaine  today to see if he would be an ideal candidate for an orchiectomy or a testicular denervation procedure.  If he has significant symptomatic relief with the spermatic cord block, he would possibly benefit with a testicular denervation or and or orchiectomy.  I also told him that testicular denervation is a highly specific procedure and he would have to see one of my partners Dr. Bañuelos to be eligible to undergo that procedure.  However, he insisted that he could not wait long due to financial issues and the fact that he has currently met his deductible with his insurance and the pain is intolerable as well as the finances will be tough for him if he have to push this procedure by more than a couple of months.  I specifically told him that I will not be able to do the testicular denervation but in case that cord block today works I would be able to do an orchiectomy which would in fact worked for him equally if not better for his orchialgia.  Based on his understanding and with his verbal consent I proceeded ahead with a test cord block using 15 mL of 1% lidocaine using all aseptic precautions and after preparing the skin with iodine solution and instilling the lidocaine into the cord after adequately palpating and localizing the vas deferens within the area of the infiltration.  He did not have any aftereffects after the infiltration of the lidocaine and felt much better in terms of his discomfort.  I recommended that he should wait for the next couple of hours to see if he has any discomfort with his regular activities and if he finds significant relief he should touch base with us so that we can schedule him for the procedure.  He agreed and will give us a call in the next couple days to update on his status and we can proceed with scheduling an orchiectomy on the right side if his symptoms improve.        Zak Catalan MD  [facility]      ==========================    Additional  Billing and Coding Information:  Review of external notes as documented above     Independent interpretation of a test performed by another physician/other qualified health care professional (not separately reported) -       Discussion of management or test interpretation with external physician/other qualified healthcare professional/appropriate source -       Diagnosis or treatment significantly limited by social determinants of health -       20 minutes spent on the date of the encounter doing chart review, review of test results, interpretation of tests, patient visit and documentation including cord block    ==========================

## 2022-10-04 NOTE — PROGRESS NOTES
"Oncology Rooming Note    October 4, 2022 3:18 PM   Saulo Riley is a 62 year old male who presents for:    Chief Complaint   Patient presents with     Urology     Initial Vitals: BP (!) 160/77   Pulse 70   Resp 18   Wt 134.2 kg (295 lb 12.8 oz)   SpO2 98%   BMI 43.68 kg/m   Estimated body mass index is 43.68 kg/m  as calculated from the following:    Height as of 9/8/22: 1.753 m (5' 9\").    Weight as of this encounter: 134.2 kg (295 lb 12.8 oz). Body surface area is 2.56 meters squared.  Severe Pain (7) Comment: Data Unavailable   No LMP for male patient.  Allergies reviewed: Yes  Medications reviewed: Yes    Medications: Medication refills not needed today.  Pharmacy name entered into [employer]: [id number] - [place], [place] - [address]    Clinical concerns: None       Rosy Coburn LPN            "

## 2022-10-04 NOTE — PATIENT INSTRUCTIONS
Test injection of spermatic cord block given today   If no improvement in the pain will refer to the pain service  If pain improved will consider for orchiectomy.

## 2022-10-04 NOTE — LETTER
"    10/4/2022         RE: Saulo Riley  [address] [place] [address]        Dear Colleague,    Thank you for referring your patient, Saulo Riley, to the [facility]. Please see a copy of my visit note below.    Oncology Rooming Note    October 4, 2022 3:18 PM   Saulo Riley is a 62 year old male who presents for:    Chief Complaint   Patient presents with     Urology     Initial Vitals: BP (!) 160/77   Pulse 70   Resp 18   Wt 134.2 kg (295 lb 12.8 oz)   SpO2 98%   BMI 43.68 kg/m   Estimated body mass index is 43.68 kg/m  as calculated from the following:    Height as of 9/8/22: 1.753 m (5' 9\").    Weight as of this encounter: 134.2 kg (295 lb 12.8 oz). Body surface area is 2.56 meters squared.  Severe Pain (7) Comment: Data Unavailable   No LMP for male patient.  Allergies reviewed: Yes  Medications reviewed: Yes    Medications: Medication refills not needed today.  Pharmacy name entered into [employer]: [id number] - [place], [place] - [address]  [address]    Clinical concerns: None       Rosy Coburn LPN              CHIEF COMPLAINT   It was my pleasure to see Saulo Riley who is a 62 year old male for follow-up of right testicular pain.      HPI:  Saulo Riley is a 62 year old male being seen for follow-up after hernia surgery.  Duration of problem: Few years  Previous treatments: Right hydrocelectomy and right direct inguinal hernia repair    Thomas comes today after about 6 weeks of hernia surgery  He feels that he is pain is still persistent on the right testis and has not improved significantly even after the hernia surgery  He still has to push the testis higher up into the scrotum towards the inguinal ring so that the pain gets better  He additionally has pain along the inner thigh which limits his sleep mobility and his ability to work  He is very concerned and distressed because of this pain and wants immediate care in relief from the " same and if at all any procedure that is needed as he has met his deductible and wants to do any procedure if necessary within this year    Exam:  BP (!) 160/77   Pulse 70   Resp 18   Wt 134.2 kg (295 lb 12.8 oz)   SpO2 98%   BMI 43.68 kg/m    General: age-appropriate appearing male in NAD sitting in an exam chair  Resp: no respiratory distress  CV: heart rate regular  Abdomen: Degree of obesity is severe. Abdomen is soft and nontender. No organomegaly. .  Well-healed hernia scar  : Bilateral testis feels normal right cord structures are thickened as compared to the left  Neuro: grossly non focal. Normal reflexes  Motor: excellent strength throughout    Review of Imaging:  The following imaging exams were independently viewed and interpreted by me and discussed with patient:      Review of Labs:  The following labs were reviewed by me and discussed with the patient:    Assessment & Plan     Chronic pain of right inguinal region  Chronic orchialgia with radiating pain along the right inner thigh  Discussed with him about the fact that we could try and do cord block with lignocaine today to see if he would be an ideal candidate for an orchiectomy or a testicular denervation procedure.  If he has significant symptomatic relief with the spermatic cord block, he would possibly benefit with a testicular denervation or and or orchiectomy.  I also told him that testicular denervation is a highly specific procedure and he would have to see one of my partners Dr. Bañuelos to be eligible to undergo that procedure.  However, he insisted that he could not wait long due to financial issues and the fact that he has currently met his deductible with his insurance and the pain is intolerable as well as the finances will be tough for him if he have to push this procedure by more than a couple of months.  I specifically told him that I will not be able to do the testicular denervation but in case that cord block today works I  would be able to do an orchiectomy which would in fact worked for him equally if not better for his orchialgia.  Based on his understanding and with his verbal consent I proceeded ahead with a test cord block using 15 mL of 1% lidocaine using all aseptic precautions and after preparing the skin with iodine solution and instilling the lidocaine into the cord after adequately palpating and localizing the vas deferens within the area of the infiltration.  He did not have any aftereffects after the infiltration of the lidocaine and felt much better in terms of his discomfort.  I recommended that he should wait for the next couple of hours to see if he has any discomfort with his regular activities and if he finds significant relief he should touch base with us so that we can schedule him for the procedure.  He agreed and will give us a call in the next couple days to update on his status and we can proceed with scheduling an orchiectomy on the right side if his symptoms improve.        Zak Catalan MD  [facility]      ==========================    Additional Billing and Coding Information:  Review of external notes as documented above     Independent interpretation of a test performed by another physician/other qualified health care professional (not separately reported) -       Discussion of management or test interpretation with external physician/other qualified healthcare professional/appropriate source -       Diagnosis or treatment significantly limited by social determinants of health -       20 minutes spent on the date of the encounter doing chart review, review of test results, interpretation of tests, patient visit and documentation including cord block    ==========================      Again, thank you for allowing me to participate in the care of your patient.        Sincerely,        Zak Catalan MD

## 2022-10-06 ENCOUNTER — PREP FOR PROCEDURE (OUTPATIENT)
Dept: UROLOGY | Facility: CLINIC | Age: 62
End: 2022-10-06

## 2022-10-06 DIAGNOSIS — G89.29 CHRONIC PAIN OF RIGHT INGUINAL REGION: ICD-10-CM

## 2022-10-06 DIAGNOSIS — N50.811 PAIN IN RIGHT TESTICLE: Primary | ICD-10-CM

## 2022-10-06 DIAGNOSIS — R10.31 CHRONIC PAIN OF RIGHT INGUINAL REGION: ICD-10-CM

## 2022-10-06 RX ORDER — CEFAZOLIN SODIUM IN 0.9 % NACL 3 G/100 ML
3 INTRAVENOUS SOLUTION, PIGGYBACK (ML) INTRAVENOUS SEE ADMIN INSTRUCTIONS
Status: CANCELLED | OUTPATIENT
Start: 2022-10-06

## 2022-10-06 RX ORDER — CEFAZOLIN SODIUM IN 0.9 % NACL 3 G/100 ML
3 INTRAVENOUS SOLUTION, PIGGYBACK (ML) INTRAVENOUS
Status: CANCELLED | OUTPATIENT
Start: 2022-10-06

## 2022-10-06 NOTE — PROGRESS NOTES
Received call from the patient and noted by  RN, that he had significant improvement following the right spermatic cord nerve block using 1% lidocaine for almost 48 hours without any recurrence of the chronic orchialgia that he was experiencing.  Based on the discussions we had in our clinic visit, I have proceed ahead with scheduling him for an orchiectomy on the right side as per his decision at that time.  Zak Catalan MD

## 2022-10-27 ENCOUNTER — ANESTHESIA (OUTPATIENT)
Dept: SURGERY | Facility: CLINIC | Age: 62
End: 2022-10-27
Payer: COMMERCIAL

## 2022-10-27 ENCOUNTER — HOSPITAL ENCOUNTER (OUTPATIENT)
Facility: CLINIC | Age: 62
Discharge: HOME | End: 2022-10-27
Attending: STUDENT IN AN ORGANIZED HEALTH CARE EDUCATION/TRAINING PROGRAM | Admitting: STUDENT IN AN ORGANIZED HEALTH CARE EDUCATION/TRAINING PROGRAM
Payer: COMMERCIAL

## 2022-10-27 ENCOUNTER — ANESTHESIA EVENT (OUTPATIENT)
Dept: SURGERY | Facility: CLINIC | Age: 62
End: 2022-10-27
Payer: COMMERCIAL

## 2022-10-27 VITALS
HEART RATE: 75 BPM | WEIGHT: 293 LBS | SYSTOLIC BLOOD PRESSURE: 129 MMHG | TEMPERATURE: 98.3 F | DIASTOLIC BLOOD PRESSURE: 80 MMHG | HEIGHT: 70 IN | BODY MASS INDEX: 41.95 KG/M2 | RESPIRATION RATE: 16 BRPM | OXYGEN SATURATION: 93 %

## 2022-10-27 DIAGNOSIS — E66.01 MORBID OBESITY (H): ICD-10-CM

## 2022-10-27 DIAGNOSIS — N50.811 PAIN IN RIGHT TESTICLE: Primary | ICD-10-CM

## 2022-10-27 LAB
CREAT SERPL-MCNC: 0.79 MG/DL (ref 0.67–1.17)
GFR SERPL CREATININE-BSD FRML MDRD: >90 ML/MIN/1.73M2
GLUCOSE BLDC GLUCOMTR-MCNC: 130 MG/DL (ref 70–99)
GLUCOSE BLDC GLUCOMTR-MCNC: 156 MG/DL (ref 70–99)

## 2022-10-27 PROCEDURE — 999N000141 HC STATISTIC PRE-PROCEDURE NURSING ASSESSMENT: Performed by: STUDENT IN AN ORGANIZED HEALTH CARE EDUCATION/TRAINING PROGRAM

## 2022-10-27 PROCEDURE — 250N000011 HC RX IP 250 OP 636

## 2022-10-27 PROCEDURE — 88305 TISSUE EXAM BY PATHOLOGIST: CPT | Mod: TC | Performed by: STUDENT IN AN ORGANIZED HEALTH CARE EDUCATION/TRAINING PROGRAM

## 2022-10-27 PROCEDURE — 710N000012 HC RECOVERY PHASE 2, PER MINUTE: Performed by: STUDENT IN AN ORGANIZED HEALTH CARE EDUCATION/TRAINING PROGRAM

## 2022-10-27 PROCEDURE — 250N000011 HC RX IP 250 OP 636: Performed by: NURSE ANESTHETIST, CERTIFIED REGISTERED

## 2022-10-27 PROCEDURE — 36415 COLL VENOUS BLD VENIPUNCTURE: CPT | Performed by: ANESTHESIOLOGY

## 2022-10-27 PROCEDURE — 250N000009 HC RX 250: Performed by: ANESTHESIOLOGY

## 2022-10-27 PROCEDURE — 272N000001 HC OR GENERAL SUPPLY STERILE: Performed by: STUDENT IN AN ORGANIZED HEALTH CARE EDUCATION/TRAINING PROGRAM

## 2022-10-27 PROCEDURE — 88305 TISSUE EXAM BY PATHOLOGIST: CPT | Mod: 26 | Performed by: PATHOLOGY

## 2022-10-27 PROCEDURE — 250N000013 HC RX MED GY IP 250 OP 250 PS 637: Performed by: ANESTHESIOLOGY

## 2022-10-27 PROCEDURE — 258N000003 HC RX IP 258 OP 636: Performed by: ANESTHESIOLOGY

## 2022-10-27 PROCEDURE — 250N000011 HC RX IP 250 OP 636: Performed by: STUDENT IN AN ORGANIZED HEALTH CARE EDUCATION/TRAINING PROGRAM

## 2022-10-27 PROCEDURE — 250N000011 HC RX IP 250 OP 636: Performed by: ANESTHESIOLOGY

## 2022-10-27 PROCEDURE — 82565 ASSAY OF CREATININE: CPT | Performed by: ANESTHESIOLOGY

## 2022-10-27 PROCEDURE — 710N000009 HC RECOVERY PHASE 1, LEVEL 1, PER MIN: Performed by: STUDENT IN AN ORGANIZED HEALTH CARE EDUCATION/TRAINING PROGRAM

## 2022-10-27 PROCEDURE — 250N000009 HC RX 250: Performed by: STUDENT IN AN ORGANIZED HEALTH CARE EDUCATION/TRAINING PROGRAM

## 2022-10-27 PROCEDURE — 370N000017 HC ANESTHESIA TECHNICAL FEE, PER MIN: Performed by: STUDENT IN AN ORGANIZED HEALTH CARE EDUCATION/TRAINING PROGRAM

## 2022-10-27 PROCEDURE — 360N000076 HC SURGERY LEVEL 3, PER MIN: Performed by: STUDENT IN AN ORGANIZED HEALTH CARE EDUCATION/TRAINING PROGRAM

## 2022-10-27 PROCEDURE — 250N000009 HC RX 250: Performed by: NURSE ANESTHETIST, CERTIFIED REGISTERED

## 2022-10-27 PROCEDURE — 82962 GLUCOSE BLOOD TEST: CPT

## 2022-10-27 PROCEDURE — 54530 REMOVAL OF TESTIS: CPT | Mod: RT | Performed by: STUDENT IN AN ORGANIZED HEALTH CARE EDUCATION/TRAINING PROGRAM

## 2022-10-27 RX ORDER — HYDROCODONE BITARTRATE AND ACETAMINOPHEN 5; 325 MG/1; MG/1
1-2 TABLET ORAL EVERY 4 HOURS PRN
Qty: 10 TABLET | Refills: 0 | Status: SHIPPED | OUTPATIENT
Start: 2022-10-27

## 2022-10-27 RX ORDER — SODIUM CHLORIDE, SODIUM LACTATE, POTASSIUM CHLORIDE, CALCIUM CHLORIDE 600; 310; 30; 20 MG/100ML; MG/100ML; MG/100ML; MG/100ML
INJECTION, SOLUTION INTRAVENOUS CONTINUOUS
Status: DISCONTINUED | OUTPATIENT
Start: 2022-10-27 | End: 2022-10-27 | Stop reason: HOSPADM

## 2022-10-27 RX ORDER — BUPIVACAINE HYDROCHLORIDE 2.5 MG/ML
INJECTION, SOLUTION EPIDURAL; INFILTRATION; INTRACAUDAL PRN
Status: DISCONTINUED | OUTPATIENT
Start: 2022-10-27 | End: 2022-10-27 | Stop reason: HOSPADM

## 2022-10-27 RX ORDER — LIDOCAINE 40 MG/G
CREAM TOPICAL
Status: DISCONTINUED | OUTPATIENT
Start: 2022-10-27 | End: 2022-10-27 | Stop reason: HOSPADM

## 2022-10-27 RX ORDER — PROPOFOL 10 MG/ML
INJECTION, EMULSION INTRAVENOUS PRN
Status: DISCONTINUED | OUTPATIENT
Start: 2022-10-27 | End: 2022-10-27

## 2022-10-27 RX ORDER — CEFAZOLIN SODIUM/WATER 3 G/30 ML
3 SYRINGE (ML) INTRAVENOUS
Status: DISCONTINUED | OUTPATIENT
Start: 2022-10-27 | End: 2022-10-27 | Stop reason: HOSPADM

## 2022-10-27 RX ORDER — AMOXICILLIN 250 MG
1-2 CAPSULE ORAL 2 TIMES DAILY
Qty: 30 TABLET | Refills: 0 | Status: SHIPPED | OUTPATIENT
Start: 2022-10-27

## 2022-10-27 RX ORDER — ONDANSETRON 4 MG/1
4 TABLET, ORALLY DISINTEGRATING ORAL EVERY 8 HOURS PRN
Qty: 4 TABLET | Refills: 0 | Status: SHIPPED | OUTPATIENT
Start: 2022-10-27

## 2022-10-27 RX ORDER — KETOROLAC TROMETHAMINE 30 MG/ML
30 INJECTION, SOLUTION INTRAMUSCULAR; INTRAVENOUS ONCE
Status: COMPLETED | OUTPATIENT
Start: 2022-10-27 | End: 2022-10-27

## 2022-10-27 RX ORDER — MAGNESIUM HYDROXIDE 1200 MG/15ML
LIQUID ORAL PRN
Status: DISCONTINUED | OUTPATIENT
Start: 2022-10-27 | End: 2022-10-27 | Stop reason: HOSPADM

## 2022-10-27 RX ORDER — FENTANYL CITRATE 50 UG/ML
INJECTION, SOLUTION INTRAMUSCULAR; INTRAVENOUS PRN
Status: DISCONTINUED | OUTPATIENT
Start: 2022-10-27 | End: 2022-10-27

## 2022-10-27 RX ORDER — CEFAZOLIN SODIUM/WATER 3 G/30 ML
3 SYRINGE (ML) INTRAVENOUS SEE ADMIN INSTRUCTIONS
Status: DISCONTINUED | OUTPATIENT
Start: 2022-10-27 | End: 2022-10-27 | Stop reason: HOSPADM

## 2022-10-27 RX ORDER — HYDROCODONE BITARTRATE AND ACETAMINOPHEN 5; 325 MG/1; MG/1
1-2 TABLET ORAL
Status: DISCONTINUED | OUTPATIENT
Start: 2022-10-27 | End: 2022-10-27 | Stop reason: HOSPADM

## 2022-10-27 RX ORDER — ONDANSETRON 2 MG/ML
INJECTION INTRAMUSCULAR; INTRAVENOUS PRN
Status: DISCONTINUED | OUTPATIENT
Start: 2022-10-27 | End: 2022-10-27

## 2022-10-27 RX ORDER — DEXAMETHASONE SODIUM PHOSPHATE 4 MG/ML
INJECTION, SOLUTION INTRA-ARTICULAR; INTRALESIONAL; INTRAMUSCULAR; INTRAVENOUS; SOFT TISSUE PRN
Status: DISCONTINUED | OUTPATIENT
Start: 2022-10-27 | End: 2022-10-27

## 2022-10-27 RX ORDER — NAPROXEN SODIUM 220 MG
220 TABLET ORAL EVERY 6 HOURS PRN
COMMUNITY

## 2022-10-27 RX ORDER — GLYCOPYRROLATE 0.2 MG/ML
INJECTION, SOLUTION INTRAMUSCULAR; INTRAVENOUS PRN
Status: DISCONTINUED | OUTPATIENT
Start: 2022-10-27 | End: 2022-10-27

## 2022-10-27 RX ADMIN — ONDANSETRON HYDROCHLORIDE 4 MG: 2 INJECTION, SOLUTION INTRAVENOUS at 14:26

## 2022-10-27 RX ADMIN — Medication 3 G: at 13:28

## 2022-10-27 RX ADMIN — PROPOFOL 200 MG: 10 INJECTION, EMULSION INTRAVENOUS at 13:33

## 2022-10-27 RX ADMIN — HYDROMORPHONE HYDROCHLORIDE 1 MG: 1 INJECTION, SOLUTION INTRAMUSCULAR; INTRAVENOUS; SUBCUTANEOUS at 13:47

## 2022-10-27 RX ADMIN — MIDAZOLAM 2 MG: 1 INJECTION INTRAMUSCULAR; INTRAVENOUS at 13:29

## 2022-10-27 RX ADMIN — KETOROLAC TROMETHAMINE 30 MG: 30 INJECTION, SOLUTION INTRAMUSCULAR at 15:30

## 2022-10-27 RX ADMIN — LIDOCAINE HYDROCHLORIDE 30 MG: 10 INJECTION, SOLUTION EPIDURAL; INFILTRATION; INTRACAUDAL; PERINEURAL at 13:33

## 2022-10-27 RX ADMIN — FENTANYL CITRATE 100 MCG: 50 INJECTION, SOLUTION INTRAMUSCULAR; INTRAVENOUS at 13:33

## 2022-10-27 RX ADMIN — GLYCOPYRROLATE 0.2 MG: 0.2 INJECTION, SOLUTION INTRAMUSCULAR; INTRAVENOUS at 13:33

## 2022-10-27 RX ADMIN — HYDROCODONE BITARTRATE AND ACETAMINOPHEN 1 TABLET: 5; 325 TABLET ORAL at 15:30

## 2022-10-27 RX ADMIN — DEXAMETHASONE SODIUM PHOSPHATE 4 MG: 4 INJECTION, SOLUTION INTRA-ARTICULAR; INTRALESIONAL; INTRAMUSCULAR; INTRAVENOUS; SOFT TISSUE at 13:33

## 2022-10-27 RX ADMIN — SODIUM CHLORIDE, POTASSIUM CHLORIDE, SODIUM LACTATE AND CALCIUM CHLORIDE: 600; 310; 30; 20 INJECTION, SOLUTION INTRAVENOUS at 13:30

## 2022-10-27 ASSESSMENT — ACTIVITIES OF DAILY LIVING (ADL)
ADLS_ACUITY_SCORE: 35

## 2022-10-27 ASSESSMENT — ENCOUNTER SYMPTOMS: SEIZURES: 1

## 2022-10-27 NOTE — OP NOTE
Operative Note   Pre-operative diagnosis:  Chronic right orchalgia   Post-operative diagnosis  same    Procedure:  Procedure(s):  Right inguinal orchiectomy    Surgeon:  Zak Catalan MD     Assistants(s):  OR Sc[employer] staff.    Estimated blood loss:  5cc   Specimens:   Right testis    Findings:   Dense inflammation with the scrotal skin of the right testis     Indications: Persistent unrelenting right testicular pain.  No improvement despite repair of right hydrocele and right inguinal hernia.  Discussed options of treatment including testicular denervation versus orchiectomy.  Pt consents for orchiectomy.      DESCRIPTION OF PROCEDURE: After full informed voluntary consent was obtained, the patient was transported to the operating room, placed supine on the table. After adequate general anesthesia was induced, he was prepped and draped in the usual sterile fashion. A timeout was taken to confirm correct patient, procedure and laterality. Pneumoboots and pre-op IV antibiotics were used.    A 6 cm Incision was made over the lower portion of inguinal region on the right . Electrocautery was used to carry dissection down to jared's fascia which was subsequently opened bluntly using the Metzenbaum scissors exposing the cord. Blunt dissection was continued proximally and distally to free up the cord and bring it up to the incision. The cord was looped and occluded with a penrose drain tourniquet.     The right testis was then delivered into the wound using blunt and electrocautery dissection of the attachments to the scrotum, taking care to avoid any violation of the scrotal skin and the gubernaculum was completely dissected and divided.  There were dense adhesions between the skin and the testis owing to prior hydrocele surgery which were carefully dissected to avoid any buttonhole in the skin.The  cord was dissected out as close to the external ring as possible owing to prior hernia surgery I did not go into the  inguinal canal.    The spermatic cord was divided into two segments and separately clamped and divided as high as possible at the level of the external ring.  Each was then stick tied with 0 Silk suture, then an 0 Vicryl hand tie was placed below the stick tie to ensure hemostasis. The wound was then washed with 50cc NS. Hemostasis was excellent.    The incisions was then closed beginning with closure of Shilpa's fascia in a deep dermal fashion using running 3 -0 Vicryl sutures. The skin was then closed with a 4-0 Monocryl in a running subcuticular fashion.  Skin glue was applied. . The patient was awakened from anesthesia and brought to the PACU in stable condition. The patient tolerated the procedure well and no intraoperative complications were noted.     Zak Catalan MD

## 2022-10-27 NOTE — ANESTHESIA PREPROCEDURE EVALUATION
Anesthesia Pre-Procedure Evaluation    Patient: Saulo Riley   MRN: [id number] : 1960        Procedure : Procedure(s):  Right inguinal approach orchiectomy          Past Medical History:   Diagnosis Date     Diabetes mellitus (H)      Hypertension      Seizures (H)      Stroke (H)           Past Surgical History:   Procedure Laterality Date     BACK SURGERY       HERNIORRHAPHY INGUINAL Right 2022    Procedure: Direct right inguinal hernia repair with Ultrapro mesh in Bryanna fashion;  Surgeon: Jose Mckee MD;  Location: [facility] OR     HYDROCELECTOMY SCROTAL       HYDROCELECTOMY SCROTAL N/A 2020    Procedure: HYDROCELECTOMY;  Surgeon: Eligio Díaz MD;  Location: [facility];  Service: Urology     NECK SURGERY       TONSILLECTOMY        No Known Allergies   Social History     Tobacco Use     Smoking status: Never     Smokeless tobacco: Never   Substance Use Topics     Alcohol use: Yes     Comment: 1 beer every 6 month      Wt Readings from Last 1 Encounters:   10/27/22 132.9 kg (293 lb)        Anesthesia Evaluation            ROS/MED HX  ENT/Pulmonary:  - neg pulmonary ROS     Neurologic:     (+) seizures,     Cardiovascular:     (+) hypertension-----    METS/Exercise Tolerance:     Hematologic:  - neg hematologic  ROS     Musculoskeletal:   (+) arthritis,     GI/Hepatic:  - neg GI/hepatic ROS     Renal/Genitourinary:  - neg Renal ROS     Endo:     (+) type II DM, Obesity,     Psychiatric/Substance Use:  - neg psychiatric ROS     Infectious Disease:       Malignancy:       Other:            Physical Exam    Airway        Mallampati: II   TM distance: > 3 FB   Neck ROM: full   Mouth opening: > 3 cm    Respiratory Devices and Support         Dental  no notable dental history         Cardiovascular   cardiovascular exam normal          Pulmonary   pulmonary exam normal                OUTSIDE LABS:  CBC: No results found for: WBC, HGB, HCT, PLT  BMP:   Lab Results    Component Value Date    CR 0.8 07/09/2022     (H) 10/27/2022     (H) 08/11/2022     COAGS: No results found for: PTT, INR, FIBR  POC: No results found for: BGM, HCG, HCGS  HEPATIC: No results found for: ALBUMIN, PROTTOTAL, ALT, AST, GGT, ALKPHOS, BILITOTAL, BILIDIRECT, JOSETTE  OTHER: No results found for: PH, LACT, A1C, VIOLETTA, PHOS, MAG, LIPASE, AMYLASE, TSH, T4, T3, CRP, SED    Anesthesia Plan    ASA Status:  3      Anesthesia Type: General.     - Airway: LMA   Induction: Intravenous.   Maintenance: Balanced.        Consents    Anesthesia Plan(s) and associated risks, benefits, and realistic alternatives discussed. Questions answered and patient/representative(s) expressed understanding.    - Discussed:     - Discussed with:  Patient      - Extended Intubation/Ventilatory Support Discussed: No.      - Patient is DNR/DNI Status: No    Use of blood products discussed: No .     Postoperative Care    Pain management: IV analgesics, Oral pain medications, Multi-modal analgesia.   PONV prophylaxis: Ondansetron (or other 5HT-3), Dexamethasone or Solumedrol     Comments:                Tej Childress MD

## 2022-10-27 NOTE — ANESTHESIA CARE TRANSFER NOTE
Patient: Saulo Riley    Procedure: Procedure(s):  Right inguinal approach orchiectomy       Diagnosis: Pain in right testicle [N50.811]  Chronic pain of right inguinal region [R10.31, G89.29]  Diagnosis Additional Information: No value filed.    Anesthesia Type:   General     Note:    Oropharynx: oropharynx clear of all foreign objects  Level of Consciousness: awake  Oxygen Supplementation: face mask    Independent Airway: airway patency satisfactory and stable  Dentition: dentition unchanged  Vital Signs Stable: post-procedure vital signs reviewed and stable  Report to RN Given: handoff report given  Patient transferred to: PACU    Handoff Report: Identifed the Patient, Identified the Reponsible Provider, Reviewed the pertinent medical history, Discussed the surgical course, Reviewed Intra-OP anesthesia mangement and issues during anesthesia, Set expectations for post-procedure period and Allowed opportunity for questions and acknowledgement of understanding      Vitals:  Vitals Value Taken Time   /53 10/27/22 1442   Temp 97  F (36.1  C) 10/27/22 1442   Pulse 82 10/27/22 1443   Resp 15 10/27/22 1443   SpO2 98 % 10/27/22 1443   Vitals shown include unvalidated device data.    Electronically Signed By: HOLLY Graham CRNA  October 27, 2022  2:44 PM

## 2022-10-27 NOTE — ANESTHESIA POSTPROCEDURE EVALUATION
Patient: Saulo Riley    Procedure: Procedure(s):  Right inguinal approach orchiectomy       Anesthesia Type:  General    Note:  Disposition: Outpatient   Postop Pain Control: Uneventful            Sign Out: Well controlled pain   PONV: No   Neuro/Psych: Uneventful            Sign Out: Acceptable/Baseline neuro status   Airway/Respiratory: Uneventful            Sign Out: Acceptable/Baseline resp. status   CV/Hemodynamics: Uneventful            Sign Out: Acceptable CV status; No obvious hypovolemia; No obvious fluid overload   Other NRE: NONE   DID A NON-ROUTINE EVENT OCCUR? No           Last vitals:  Vitals Value Taken Time   /87 10/27/22 1525   Temp 97  F (36.1  C) 10/27/22 1442   Pulse 66 10/27/22 1529   Resp 10 10/27/22 1529   SpO2 97 % 10/27/22 1529   Vitals shown include unvalidated device data.    Electronically Signed By: Tej Childress MD  October 27, 2022  3:31 PM

## 2022-10-27 NOTE — DISCHARGE INSTRUCTIONS
"  Maximum Ibuprofen/Motrin in 24 hours = 3,000 mg.    Maximum Acetaminophen/Tylenol in 24 hours = 4,000 mg.    See Med. Details regarding Naproxan.    Activity  - No strenuous exercise for 6 weeks.  - No lifting, pushing, pulling more than 10 pounds for 6 weeks.   - Do not strain with bowel movements.  - Do not drive until you can press the brake pedal quickly and fully without pain.   - Do not operate a motor vehicle while taking narcotic pain medications.     Incisions  - You may shower and get incisions wet starting 48 hrs after surgery.  - Do not scrub incisions or submerge wounds for 2 weeks or until seen in follow-up.   - Remove wound dressing 48 hours after surgery.  Continue scrotal support for 6 weeks  - Leave incision open to air. Cover with gauze only if needed for comfort or to protect clothing from drainage.   - The stitches do not need to be removed, they will dissolve on their own.    Medications  - Take Tylenol or ibuprofen for pain as needed  - Bacitracin ointment to apply locally once a day   Follow-Up:  - Call your primary care provider to touch base regarding your recent admission.   - Call the urology clinic to set up a follow up in 4 weeks  - Call or return sooner than your regularly scheduled visit if you develop any of the following: fever (greater than 101.5), uncontrolled pain, uncontrolled nausea or vomiting, as well as increased redness, swelling, or drainage from your wound.     Phone numbers:   - Monday through Friday 8am to 4:30pm: Call [phone] with questions or to schedule or confirm appointment.    - Nights or weekends: call [facility] and tell the  \"I would like to page the Urology  on call.\"  - For emergencies, call [phone]     GENERAL ANESTHESIA OR SEDATION ADULT DISCHARGE INSTRUCTIONS   SPECIAL PRECAUTIONS FOR 24 HOURS AFTER SURGERY    IT IS NOT UNUSUAL TO FEEL LIGHT-HEADED OR FAINT, UP TO 24 HOURS AFTER SURGERY OR WHILE TAKING PAIN MEDICATION.  IF YOU HAVE THESE " SYMPTOMS; SIT FOR A FEW MINUTES BEFORE STANDING AND HAVE SOMEONE ASSIST YOU WHEN YOU GET UP TO WALK OR USE THE BATHROOM.    YOU SHOULD REST AND RELAX FOR THE NEXT 24 HOURS AND YOU MUST MAKE ARRANGEMENTS TO HAVE SOMEONE STAY WITH YOU FOR AT LEAST 24 HOURS AFTER YOUR DISCHARGE.  AVOID HAZARDOUS AND STRENUOUS ACTIVITIES.  DO NOT MAKE IMPORTANT DECISIONS FOR 24 HOURS.    DO NOT DRIVE ANY VEHICLE OR OPERATE MECHANICAL EQUIPMENT FOR 24 HOURS FOLLOWING THE END OF YOUR SURGERY.  EVEN THOUGH YOU MAY FEEL NORMAL, YOUR REACTIONS MAY BE AFFECTED BY THE MEDICATION YOU HAVE RECEIVED.    DO NOT DRINK ALCOHOLIC BEVERAGES FOR 24 HOURS FOLLOWING YOUR SURGERY.    DRINK CLEAR LIQUIDS (APPLE JUICE, GINGER ALE, 7-UP, BROTH, ETC.).  PROGRESS TO YOUR REGULAR DIET AS YOU FEEL ABLE.    YOU MAY HAVE A DRY MOUTH, A SORE THROAT, MUSCLES ACHES OR TROUBLE SLEEPING.  THESE SHOULD GO AWAY AFTER 24 HOURS.    CALL YOUR DOCTOR FOR ANY OF THE FOLLOWING:  SIGNS OF INFECTION (FEVER, GROWING TENDERNESS AT THE SURGERY SITE, A LARGE AMOUNT OF DRAINAGE OR BLEEDING, SEVERE PAIN, FOUL-SMELLING DRAINAGE, REDNESS OR SWELLING.    IT HAS BEEN OVER 8 TO 10 HOURS SINCE SURGERY AND YOU ARE STILL NOT ABLE TO URINATE (PASS WATER).

## 2022-10-28 LAB
PATH REPORT.COMMENTS IMP SPEC: NORMAL
PATH REPORT.COMMENTS IMP SPEC: NORMAL
PATH REPORT.FINAL DX SPEC: NORMAL
PATH REPORT.GROSS SPEC: NORMAL
PATH REPORT.MICROSCOPIC SPEC OTHER STN: NORMAL
PATH REPORT.RELEVANT HX SPEC: NORMAL
PHOTO IMAGE: NORMAL

## 2022-10-31 ENCOUNTER — APPOINTMENT (OUTPATIENT)
Dept: ULTRASOUND IMAGING | Facility: CLINIC | Age: 62
End: 2022-10-31
Attending: EMERGENCY MEDICINE
Payer: COMMERCIAL

## 2022-10-31 ENCOUNTER — APPOINTMENT (OUTPATIENT)
Dept: CT IMAGING | Facility: CLINIC | Age: 62
End: 2022-10-31
Attending: EMERGENCY MEDICINE
Payer: COMMERCIAL

## 2022-10-31 ENCOUNTER — HOSPITAL ENCOUNTER (EMERGENCY)
Facility: CLINIC | Age: 62
Discharge: HOME | End: 2022-10-31
Attending: EMERGENCY MEDICINE | Admitting: EMERGENCY MEDICINE
Payer: COMMERCIAL

## 2022-10-31 VITALS
HEART RATE: 77 BPM | OXYGEN SATURATION: 96 % | RESPIRATION RATE: 18 BRPM | DIASTOLIC BLOOD PRESSURE: 83 MMHG | TEMPERATURE: 98.9 F | SYSTOLIC BLOOD PRESSURE: 138 MMHG

## 2022-10-31 DIAGNOSIS — Z90.79 S/P ORCHIECTOMY: ICD-10-CM

## 2022-10-31 DIAGNOSIS — N50.89 SCROTAL SWELLING: ICD-10-CM

## 2022-10-31 DIAGNOSIS — N50.811 PAIN IN RIGHT TESTICLE: Primary | ICD-10-CM

## 2022-10-31 PROCEDURE — 99285 EMERGENCY DEPT VISIT HI MDM: CPT | Mod: 25

## 2022-10-31 PROCEDURE — 74177 CT ABD & PELVIS W/CONTRAST: CPT

## 2022-10-31 PROCEDURE — 250N000009 HC RX 250: Performed by: EMERGENCY MEDICINE

## 2022-10-31 PROCEDURE — 250N000011 HC RX IP 250 OP 636: Performed by: EMERGENCY MEDICINE

## 2022-10-31 PROCEDURE — 76870 US EXAM SCROTUM: CPT

## 2022-10-31 RX ORDER — IOPAMIDOL 755 MG/ML
500 INJECTION, SOLUTION INTRAVASCULAR ONCE
Status: COMPLETED | OUTPATIENT
Start: 2022-10-31 | End: 2022-10-31

## 2022-10-31 RX ADMIN — SODIUM CHLORIDE 65 ML: 9 INJECTION, SOLUTION INTRAVENOUS at 21:28

## 2022-10-31 RX ADMIN — IOPAMIDOL 100 ML: 755 INJECTION, SOLUTION INTRAVENOUS at 21:28

## 2022-10-31 ASSESSMENT — ENCOUNTER SYMPTOMS
FEVER: 0
VOMITING: 0
DYSURIA: 0
ABDOMINAL PAIN: 0
NAUSEA: 0
FREQUENCY: 0
DIFFICULTY URINATING: 0

## 2022-10-31 ASSESSMENT — ACTIVITIES OF DAILY LIVING (ADL)
ADLS_ACUITY_SCORE: 35
ADLS_ACUITY_SCORE: 35

## 2022-10-31 NOTE — ED TRIAGE NOTES
Pt had right teste removed on Thursday, the swelling to the scrotum has become worse. Pt contacted surgeon's office and they advised him to come to the ED.      Triage Assessment     Row Name 10/31/22 4633       Triage Assessment (Adult)    Airway WDL WDL       Respiratory WDL    Respiratory WDL WDL       Skin Circulation/Temperature WDL    Skin Circulation/Temperature WDL WDL       Cardiac WDL    Cardiac WDL WDL       Peripheral/Neurovascular WDL    Peripheral Neurovascular WDL WDL       Cognitive/Neuro/Behavioral WDL    Cognitive/Neuro/Behavioral WDL WDL

## 2022-10-31 NOTE — PROGRESS NOTES
Spoke with patient and he informed me that he has testicular swelling about the size of a softball. Consulted with Dr. Catalan and he informed me that patient should go to ER. This was offered to patient due to the non-normal symptoms following procedure. Per MD, okay to have US tomorrow of testicle if patient refuses to go the ER. Patient was placed on hold for a , this nurse picked him back up and he stated that he changed his mind and will just go to the ER.     Rebeca Olson LPN

## 2022-11-01 NOTE — ED PROVIDER NOTES
Rapid Assessment Note    History:   Saulo Riley is a 62 year old male who presents post orchiectomy on 10/27 for chronic orchalgia, and presents with worsening scrotum swelling. He also notes that he is having pain near the surgical site. The patient reports that on saturday morning woke up sweating but denies any other changes including a found fever, or urinary issues.     Exam:   General:  Alert, interactive  Cardiovascular:  Well perfused  Lungs:  No respiratory distress, no accessory muscle use  Neuro:  Moving all 4 extremities  Skin:  Warm, dry  Psych:  Normal affect  : massive R scrotal swelling; steri-stripped inguinal incision.  No obvious cellulitis or drainage.     Plan of Care:   I evaluated the patient and developed an initial plan of care. I discussed this plan and explained that I, or one of my partners, would be returning to complete the evaluation.     Operative note reviewed; testicular US ordered, after which will need to discuss with Urology (Dr. Catalan was treating Urologist, his clinic sent patient to ED).    I, Primitivo Thurston, am serving as a scribe to document services personally performed by Kevin Whyte MD, based on my observations and the provider's statements to me.    10/31/2022  [employer]    Portions of this medical record were completed by a scribe. UPON MY REVIEW AND AUTHENTICATION BY ELECTRONIC SIGNATURE, this confirms (a) I performed the applicable clinical services, and (b) the record is accurate.        Kevin Whyte MD  11/25/22 9174

## 2022-11-01 NOTE — DISCHARGE INSTRUCTIONS
Wear tight fitting underwear for scrotal support.    Use Tylenol and the pain medication prescribed by your urologist as needed for pain.    Return to the ER if you develop fever, worsening pain, increasing swelling or any new concerning symptom.    Follow up with your urologist as scheduled.

## 2022-11-01 NOTE — ED PROVIDER NOTES
History   Chief Complaint:  Post-op Problem       HPI   Saulo Riley is a 62 year old male with history of recent right orchiectomy on 10/27 with Dr. Catalan presenting to the ER for evaluation of persistent right scrotal swelling.  Patient reports having scrotal swelling 10/28 as well as pain in the center of the scrotum.  Pain and swelling have persisted and not gotten any worse since the surgery.  Due to persistence of swelling despite icing the scrotum, patient called urology who advised him to come to the ER.  He denies any fever, dysuria/urgency/frequency, difficulty urinating, nausea/vomiting, inability to pass flatus, diarrhea/constipation, abdominal pain.  Reports having right inguinal hernia repair with mesh 8/11/2 with Dr. Mckee.    Review of Systems   Constitutional: Negative for fever.   Gastrointestinal: Negative for abdominal pain, nausea and vomiting.   Genitourinary: Positive for scrotal swelling. Negative for difficulty urinating, dysuria, frequency, testicular pain and urgency.   All other systems reviewed and are negative.      Allergies:  No Known Allergies    Medications:  allopurinoL (ZYLOPRIM) 300 MG tablet  aspirin 81 MG EC tablet  atorvastatin (LIPITOR) 80 MG tablet  gabapentin (NEURONTIN) 100 MG capsule  HYDROcodone-acetaminophen (NORCO) 5-325 MG tablet  JANUVIA 100 MG tablet  lisinopril (ZESTRIL) 40 MG tablet  metFORMIN (GLUCOPHAGE) 1000 MG tablet  naproxen sodium (ANAPROX) 220 MG tablet  ondansetron (ZOFRAN ODT) 4 MG ODT tab  senna-docusate (SENOKOT-S/PERICOLACE) 8.6-50 MG tablet        Past Medical History:     Past Medical History:   Diagnosis Date     Diabetes mellitus (H)      Hypertension      Seizures (H)      Stroke (H)      Patient Active Problem List    Diagnosis Date Noted     Morbid obesity (H) 07/08/2022     Priority: Medium        Past Surgical History:    Past Surgical History:   Procedure Laterality Date     BACK SURGERY       HERNIORRHAPHY INGUINAL Right 8/11/2022     Procedure: Direct right inguinal hernia repair with Ultrapro mesh in Bryanna fashion;  Surgeon: Jose Mckee MD;  Location: [facility] OR     HYDROCELECTOMY SCROTAL       HYDROCELECTOMY SCROTAL N/A 12/4/2020    Procedure: HYDROCELECTOMY;  Surgeon: Eligio Díaz MD;  Location: [facility] OR;  Service: Urology     NECK SURGERY       ORCHIECTOMY INGUINAL Right 10/27/2022    Procedure: Right inguinal orchiectomy;  Surgeon: Zak Catalan MD;  Location: [facility] OR     TONSILLECTOMY          Family History:    No family history on file.    Social History:  The patient presents to the ED alon3.    Physical Exam     Patient Vitals for the past 24 hrs:   BP Temp Temp src Pulse Resp SpO2   10/31/22 1655 (!) 151/91 98.9  F (37.2  C) Temporal 79 18 96 %       Physical Exam  General: the patient is awake and interactive  HEENT:  Moist mucous membranes, conjunctiva normal  Pulmonary:  Normal respiratory effort  Cardiovascular:  Well perfused  Musculoskeletal:  Moving 4 extremities grossly wnl, no deformities  Abdomen: Soft, nontender, nondistended.  No guarding or rebound tenderness.  :  Scrotal swelling bilaterally. Bruising noted to the anterolateral/inferior aspect of the right scrotum.  No red streaking.  Pubis incision appears c/d/i without erythema or drainage.  Neuro:  Speech normal, no focal deficits    Emergency Department Course     Imaging:  Abd/pelvis CT,  IV  contrast only TRAUMA / AAA   Final Result   IMPRESSION:    1.  Postsurgical changes of right orchiectomy with blood products in the scrotum, likely corresponds to findings on prior ultrasound.    2.  No inguinal hernia.      US Testicular & Scrotum w Doppler Ltd   Final Result   IMPRESSION:   1.  I'm concerned for changes of significant herniated bowel within the right scrotum. Unenhanced CT examination of the pelvis to include the proximal thighs would be beneficial for further evaluation.      2.  Prominent left hydrocele.        Report  per radiology.     Laboratory:  Labs Ordered and Resulted from Time of ED Arrival to Time of ED Departure - No data to display    Procedures  None    Emergency Department Course:      Reviewed:  I reviewed nursing notes, vitals, and past medical history    Assessments:   I performed a physical exam of the patient. Findings as above.      Patient rechecked and updated. Plan of care discussed and questions answered.     Consults:   10:09 PM -  I spoke with on call urologist regarding patient's presentation, findings, and plan of care.    Interventions:  Medications   iopamidol (ISOVUE-370) solution 500 mL (100 mLs Intravenous Given 10/31/22 2128)   CT scan flush (65 mLs Intravenous Given 10/31/22 2128)       Disposition:  The patient was discharged to home.     Impression & Plan       Covid-19  Saulo Riley was evaluated during a global COVID-19 pandemic, which necessitated consideration that the patient might be at risk for infection with the SARS-CoV-2 virus that causes COVID-19.   Applicable protocols for evaluation were followed during the patient's care.   COVID-19 was considered as part of the patient's evaluation.       Medical Decision Making:  Saulo Riley is a 62 year old male with recent right orchiectomy 10/27 presenting to the ER for persistent right scrotal swelling.  Please see above for details of HPI and exam.  Denies any urinary symptoms.  He is afebrile and vitally stable.  Noted scrotal swelling and bruising; surgical incision appears clean and intact.  Intake ultrasound concerning for possible bowel continence in the scrotum.  This was followed up with CT scan otherwise showing postsurgical changes with blood products in the scrotum.  Discussed case with urology and this is to be expected post orchiectomy; no acute recommendations but does recommend tight fitting underwear/scrotal support.  Patient otherwise well-appearing without signs of infectious process; no signs of Lenin gangrene.   Discussed overall findings with the patient.  No indication for further labs.  Patient denies any urinary symptoms or difficulty with urination.  He is safe to discharge home with expectant management of postoperative swelling.  Discussed reasons to return to the ER.  All questions answered prior to discharge.        Diagnosis:    ICD-10-CM    1. Scrotal swelling  N50.89       2. S/P orchiectomy  Z90.79           Discharge Medications:  New Prescriptions    No medications on file       [facility]         Nguyễn Lozano MD  10/31/22 9576

## 2022-11-23 ENCOUNTER — OFFICE VISIT (OUTPATIENT)
Dept: UROLOGY | Facility: CLINIC | Age: 62
End: 2022-11-23
Payer: COMMERCIAL

## 2022-11-23 VITALS — SYSTOLIC BLOOD PRESSURE: 179 MMHG | HEART RATE: 103 BPM | DIASTOLIC BLOOD PRESSURE: 84 MMHG

## 2022-11-23 DIAGNOSIS — S30.22XA SCROTAL HEMATOMA: Primary | ICD-10-CM

## 2022-11-23 DIAGNOSIS — R10.31 CHRONIC PAIN OF RIGHT INGUINAL REGION: ICD-10-CM

## 2022-11-23 DIAGNOSIS — G89.29 CHRONIC PAIN OF RIGHT INGUINAL REGION: ICD-10-CM

## 2022-11-23 PROCEDURE — 2894A VOIDCORRECT: CPT | Mod: 25 | Performed by: STUDENT IN AN ORGANIZED HEALTH CARE EDUCATION/TRAINING PROGRAM

## 2022-11-23 PROCEDURE — 54700 I&D EPDIDYMS TSTIS&/SCROT SP: CPT | Mod: 58 | Performed by: STUDENT IN AN ORGANIZED HEALTH CARE EDUCATION/TRAINING PROGRAM

## 2022-11-23 RX ORDER — LIDOCAINE HYDROCHLORIDE 20 MG/ML
20 INJECTION, SOLUTION INFILTRATION; PERINEURAL ONCE
Status: COMPLETED | OUTPATIENT
Start: 2022-11-23 | End: 2022-11-23

## 2022-11-23 RX ORDER — AMOXICILLIN AND CLAVULANATE POTASSIUM 500; 125 MG/1; MG/1
1 TABLET, FILM COATED ORAL 2 TIMES DAILY
Qty: 6 TABLET | Refills: 0 | Status: SHIPPED | OUTPATIENT
Start: 2022-11-23 | End: 2022-11-26

## 2022-11-23 RX ADMIN — LIDOCAINE HYDROCHLORIDE 20 ML: 20 INJECTION, SOLUTION INFILTRATION; PERINEURAL at 13:30

## 2022-11-23 ASSESSMENT — PAIN SCALES - GENERAL: PAINLEVEL: MODERATE PAIN (5)

## 2022-11-23 NOTE — NURSING NOTE
Chief Complaint   Patient presents with     scrotal swelling     Post op     Patient still having scrotal swelling, and moderate pain, especially with walking.      The following medication was given:     MEDICATION:  Lidocaine 2% Soln  ROUTE: Infiltration  SITE: Scrotum  DOSE: 2 mL  LOT #: [id number]  : ProZyme  EXPIRATION DATE: 03/26  NDC#: 11225-338-47  Was there drug waste? Yes  Amount of drug waste (mL): 18.  Reason for waste:  As per MD  Multi-dose vial: Yes      Jazlyn Granados, EMT

## 2022-11-23 NOTE — PROGRESS NOTES
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      HPI:  Saulo Riley is a 62 year old male being seen for follow-up and evaluation of persistent pain on the right side.  Duration of problem: Few years  Previous treatments: Right inguinal orchiectomy on 10/27/2022    Reviewed previous notes from Dr. Marta Guzman was seen after the procedure to the ED with persistent swelling on the right scrotum  He subsequently had a CT and ultrasound which was indicated of a hematoma on the right scrotal sac  He now is following up with persistent swelling on the right side and some mild pain which she can tolerate  He has not had any fever or any signs of infection and is otherwise doing well    Exam:  BP (!) 179/84   Pulse 103   General: age-appropriate appearing male in NAD sitting in an exam chair  Resp: no respiratory distress  CV: heart rate regular  Abdomen: Degree of obesity is severe. Abdomen is soft and nontender. No organomegaly.   : Fluctuant swelling on the right scrotum consistent with hematoma  Neuro: grossly non focal. Normal reflexes  Motor: excellent strength throughout    Under all aseptic precautions I prepped the right scrotal side with Betadine  I then instilled about 2 cc of 1% lidocaine into the skin for local anesthesia  Then I inserted a 18-gauge needle connected to a 20 cc syringe and was able to aspirate about 3 syringe.  (60 cc) of dark altered blood colored material.  Subsequently I could not aspirate more but there was some residual swelling.  This is much less than what we had initially started with.    Review of Imaging:  The following imaging exams were independently viewed and interpreted by me and discussed with patient:  CT Scan Abd/Pelvis: Abnormal: Scrotal hematoma    Review of Labs:  The following labs were reviewed by me and discussed with the patient:  Surgical path: Abnormal:  Suggestive of atrophic changes in the right testis    Assessment & Plan     Chronic pain of right inguinal region  Still has some mild pain possibly related to the scrotal hematoma  Pain is considerably low as compared to before    Scrotal hematoma  Residual swelling after aspiration  Started him on antibiotics in view of the intervention done today and his history of diabetes  Have also placed a referral for IR to have a review of the persistent swelling for possible aspiration/drain placement  Will want to see him back in a couple weeks to see how he is doing.    - IR Referral; Future  - amoxicillin-clavulanate (AUGMENTIN) 500-125 MG tablet; Take 1 tablet by mouth 2 times daily for 3 days      Zak Catalan MD  [facility] [place]      ==========================    Additional Billing and Coding Information:  Review of external notes as documented above   Review of the result(s) of each unique test - CT abdomen pelvis, surgical path    Independent interpretation of a test performed by another physician/other qualified health care professional (not separately reported) -       Discussion of management or test interpretation with external physician/other qualified healthcare professional/appropriate source -           15 minutes spent on the date of the encounter doing chart review, review of test results, interpretation of tests, patient visit and documentation apart from time spent at aspiration    ==========================

## 2022-11-23 NOTE — LETTER
11/23/2022       RE: Saulo Riley  [address] [address] [facility] [address]     Dear Colleague,    Thank you for referring your patient, Saulo Riley, to the [facility] UROLOGY CLINIC [place] at . Please see a copy of my visit note below.    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      HPI:  Saulo Riley is a 62 year old male being seen for follow-up and evaluation of persistent pain on the right side.  Duration of problem: Few years  Previous treatments: Right inguinal orchiectomy on 10/27/2022    Reviewed previous notes from Dr. Marta Guzman was seen after the procedure to the ED with persistent swelling on the right scrotum  He subsequently had a CT and ultrasound which was indicated of a hematoma on the right scrotal sac  He now is following up with persistent swelling on the right side and some mild pain which she can tolerate  He has not had any fever or any signs of infection and is otherwise doing well    Exam:  BP (!) 179/84   Pulse 103   General: age-appropriate appearing male in NAD sitting in an exam chair  Resp: no respiratory distress  CV: heart rate regular  Abdomen: Degree of obesity is severe. Abdomen is soft and nontender. No organomegaly.   : Fluctuant swelling on the right scrotum consistent with hematoma  Neuro: grossly non focal. Normal reflexes  Motor: excellent strength throughout    Under all aseptic precautions I prepped the right scrotal side with Betadine  I then instilled about 2 cc of 1% lidocaine into the skin for local anesthesia  Then I inserted a 18-gauge needle connected to a 20 cc syringe and was able to aspirate about 3 syringe.  (60 cc) of dark altered blood colored material.  Subsequently I could not aspirate more but there was some residual swelling.  This is much less than what we had  initially started with.    Review of Imaging:  The following imaging exams were independently viewed and interpreted by me and discussed with patient:  CT Scan Abd/Pelvis: Abnormal: Scrotal hematoma    Review of Labs:  The following labs were reviewed by me and discussed with the patient:  Surgical path: Abnormal: Suggestive of atrophic changes in the right testis    Assessment & Plan     Chronic pain of right inguinal region  Still has some mild pain possibly related to the scrotal hematoma  Pain is considerably low as compared to before    Scrotal hematoma  Residual swelling after aspiration  Started him on antibiotics in view of the intervention done today and his history of diabetes  Have also placed a referral for IR to have a review of the persistent swelling for possible aspiration/drain placement  Will want to see him back in a couple weeks to see how he is doing.    - IR Referral; Future  - amoxicillin-clavulanate (AUGMENTIN) 500-125 MG tablet; Take 1 tablet by mouth 2 times daily for 3 days      Zak Catalan MD  [facility] UROLOGY CLINIC [place]      ==========================    Additional Billing and Coding Information:  Review of external notes as documented above   Review of the result(s) of each unique test - CT abdomen pelvis, surgical path    Independent interpretation of a test performed by another physician/other qualified health care professional (not separately reported) -       Discussion of management or test interpretation with external physician/other qualified healthcare professional/appropriate source -     15 minutes spent on the date of the encounter doing chart review, review of test results, interpretation of tests, patient visit and documentation apart from time spent at aspiration    ==========================

## 2022-12-02 ENCOUNTER — HOSPITAL ENCOUNTER (OUTPATIENT)
Dept: ULTRASOUND IMAGING | Facility: CLINIC | Age: 62
Discharge: HOME | End: 2022-12-02
Attending: STUDENT IN AN ORGANIZED HEALTH CARE EDUCATION/TRAINING PROGRAM | Admitting: STUDENT IN AN ORGANIZED HEALTH CARE EDUCATION/TRAINING PROGRAM
Payer: COMMERCIAL

## 2022-12-02 VITALS — SYSTOLIC BLOOD PRESSURE: 167 MMHG | DIASTOLIC BLOOD PRESSURE: 98 MMHG

## 2022-12-02 DIAGNOSIS — S30.22XA SCROTAL HEMATOMA: ICD-10-CM

## 2022-12-02 PROCEDURE — 250N000009 HC RX 250: Performed by: RADIOLOGY

## 2022-12-02 PROCEDURE — 272N000431 US ASPIRATION OF SEROMA/HEMATOMA/ABSCESS/CYST

## 2022-12-02 RX ADMIN — LIDOCAINE HYDROCHLORIDE 10 ML: 10 INJECTION, SOLUTION EPIDURAL; INFILTRATION; INTRACAUDAL; PERINEURAL at 12:28

## 2022-12-02 NOTE — PROGRESS NOTES
Dr. Fields performed right scrotal aspiration with image guidance. 80mls of sanguinous fluid aspirated. Pt tolerated procedure well. Pt following up with urology.

## 2022-12-07 ENCOUNTER — OFFICE VISIT (OUTPATIENT)
Dept: UROLOGY | Facility: CLINIC | Age: 62
End: 2022-12-07
Payer: COMMERCIAL

## 2022-12-07 VITALS — HEIGHT: 69 IN | BODY MASS INDEX: 43.25 KG/M2 | WEIGHT: 292 LBS

## 2022-12-07 DIAGNOSIS — S30.22XA SCROTAL HEMATOMA: Primary | ICD-10-CM

## 2022-12-07 PROCEDURE — 99213 OFFICE O/P EST LOW 20 MIN: CPT | Performed by: STUDENT IN AN ORGANIZED HEALTH CARE EDUCATION/TRAINING PROGRAM

## 2022-12-07 ASSESSMENT — PAIN SCALES - GENERAL: PAINLEVEL: MODERATE PAIN (5)

## 2022-12-07 NOTE — LETTER
"12/7/2022       RE: Saulo Riley  [address] [address] [facility] [address]     Dear Colleague,    Thank you for referring your patient, Saulo Riley, to the [facility] UROLOGY CLINIC [place] at [facility]. Please see a copy of my visit note below.    CHIEF COMPLAINT   It was my pleasure to see Saulo Riley who is a 62 year old male for follow-up of scrotal hematoma following orchiectomy.      HPI:  Saulo Riley is a 62 year old male being seen for follow-up after aspiration under IR guidance.  Duration of problem: 1 month  Previous treatments: Aspiration      Reviewed previous notes from Dr. Fields  He had a immediately recurring swelling on the right side of the testis after aspiration about a week ago  Continues to have discomfort and swelling of the right scrotal sac  No fever chills or rigor  Exam:  Ht 1.74 m (5' 8.5\")   Wt 132.5 kg (292 lb)   BMI 43.75 kg/m    General: age-appropriate appearing male in NAD sitting in an exam chair  Resp: no respiratory distress  CV: heart rate regular  Abdomen: Degree of obesity is severe. Abdomen is soft and nontender. No organomegaly.  : Dense scrotal swelling on the right scrotal sac nontender no superficial signs of cellulitis  Neuro: grossly non focal. Normal reflexes  Motor: excellent strength throughout    Review of Imaging:  The following imaging exams were independently viewed and interpreted by me and discussed with patient:  Scrotal Ultrasound/aspiration:  CONCLUSION:  1.  Successful image-guided aspiration of right hemiscrotum hematoma  with 80 cc of dark red fluid aspirated. There is still a solid  hematoma component within the right hemiscrotum. Recommend return to  radiology and 2-3 weeks for follow-up ultrasound and possible repeat  aspiration.     Review of Labs:  The following labs were reviewed by me and discussed with the patient:      Assessment & Plan     Scrotal " hematoma  Discussed with Thomas about the recurrence of the hematoma and the fact that this was anticipated based on my discussions with Dr. Fields.  I therefore called his office and discuss further course of action and we agreed upon performing a repeat aspiration as he felt that there was a possibility that the residual clot had lysed/liquefied and resulted in this repeat collection.  The plan was therefore to aspirate and not place a drain as it was felt that he is likely to get the collection infected if a drain is placed owing to the location.  I also discussed with him that he may try to use alteplase to lyse the clot if there is one present at ultrasound following aspiration  - US Drainage Seroma/Hematoma Abscess/Cyst; Future      Zak Catalan MD  [facility] UROLOGY [facility]      ==========================    Additional Billing and Coding Information:  Review of external notes as documented above   Review of the result(s) of each unique test - Scrotal ultrasound/aspiration    Independent interpretation of a test performed by another physician/other qualified health care professional (not separately reported) -       Discussion of management or test interpretation with external physician/other qualified healthcare professional/appropriate source -   I called the interventional radiologist and discussed with him about the management plan        20 minutes spent on the date of the encounter doing chart review, review of test results, interpretation of tests, patient visit, documentation and discussion with other provider(s)     ==========================     No

## 2022-12-07 NOTE — NURSING NOTE
Chief Complaint   Patient presents with     Scrotal hematoma     Pt here for follow up after IR     Pt states right side is swollen again, pt states this happened within 24 hrs after being drained.     Paulette Chavez, CMA

## 2022-12-10 NOTE — PROGRESS NOTES
"CHIEF COMPLAINT   It was my pleasure to see Saulo Riley who is a 62 year old male for follow-up of scrotal hematoma following orchiectomy.      HPI:  Saulo Riley is a 62 year old male being seen for follow-up after aspiration under IR guidance.  Duration of problem: 1 month  Previous treatments: Aspiration      Reviewed previous notes from Dr. Fields  He had a immediately recurring swelling on the right side of the testis after aspiration about a week ago  Continues to have discomfort and swelling of the right scrotal sac  No fever chills or rigor  Exam:  Ht 1.74 m (5' 8.5\")   Wt 132.5 kg (292 lb)   BMI 43.75 kg/m    General: age-appropriate appearing male in NAD sitting in an exam chair  Resp: no respiratory distress  CV: heart rate regular  Abdomen: Degree of obesity is severe. Abdomen is soft and nontender. No organomegaly.  : Dense scrotal swelling on the right scrotal sac nontender no superficial signs of cellulitis  Neuro: grossly non focal. Normal reflexes  Motor: excellent strength throughout    Review of Imaging:  The following imaging exams were independently viewed and interpreted by me and discussed with patient:  Scrotal Ultrasound/aspiration:  CONCLUSION:  1.  Successful image-guided aspiration of right hemiscrotum hematoma  with 80 cc of dark red fluid aspirated. There is still a solid  hematoma component within the right hemiscrotum. Recommend return to  radiology and 2-3 weeks for follow-up ultrasound and possible repeat  aspiration.     Review of Labs:  The following labs were reviewed by me and discussed with the patient:      Assessment & Plan     Scrotal hematoma  Discussed with Thomas about the recurrence of the hematoma and the fact that this was anticipated based on my discussions with Dr. Fields.  I therefore called his office and discuss further course of action and we agreed upon performing a repeat aspiration as he felt that there was a possibility that the residual clot had " lysed/liquefied and resulted in this repeat collection.  The plan was therefore to aspirate and not place a drain as it was felt that he is likely to get the collection infected if a drain is placed owing to the location.  I also discussed with him that he may try to use alteplase to lyse the clot if there is one present at ultrasound following aspiration  - US Drainage Seroma/Hematoma Abscess/Cyst; Future      Zak Catalan MD  [facility] UROLOGY [facility]      ==========================    Additional Billing and Coding Information:  Review of external notes as documented above   Review of the result(s) of each unique test - Scrotal ultrasound/aspiration    Independent interpretation of a test performed by another physician/other qualified health care professional (not separately reported) -       Discussion of management or test interpretation with external physician/other qualified healthcare professional/appropriate source -   I called the interventional radiologist and discussed with him about the management plan        20 minutes spent on the date of the encounter doing chart review, review of test results, interpretation of tests, patient visit, documentation and discussion with other provider(s)     ==========================

## 2022-12-12 ENCOUNTER — HOSPITAL ENCOUNTER (OUTPATIENT)
Dept: ULTRASOUND IMAGING | Facility: CLINIC | Age: 62
Discharge: HOME | End: 2022-12-12
Attending: STUDENT IN AN ORGANIZED HEALTH CARE EDUCATION/TRAINING PROGRAM | Admitting: STUDENT IN AN ORGANIZED HEALTH CARE EDUCATION/TRAINING PROGRAM
Payer: COMMERCIAL

## 2022-12-12 DIAGNOSIS — S30.22XA SCROTAL HEMATOMA: ICD-10-CM

## 2022-12-12 PROCEDURE — 250N000011 HC RX IP 250 OP 636: Performed by: RADIOLOGY

## 2022-12-12 PROCEDURE — 250N000009 HC RX 250: Performed by: RADIOLOGY

## 2022-12-12 PROCEDURE — 272N000042 US ASPIRATION OF SEROMA/HEMATOMA/ABSCESS/CYST

## 2022-12-12 RX ORDER — ACETAMINOPHEN 325 MG/1
650 TABLET ORAL EVERY 4 HOURS PRN
Status: CANCELLED | OUTPATIENT
Start: 2022-12-12

## 2022-12-12 RX ADMIN — LIDOCAINE HYDROCHLORIDE 10 ML: 10 INJECTION, SOLUTION EPIDURAL; INFILTRATION; INTRACAUDAL; PERINEURAL at 11:20

## 2022-12-12 RX ADMIN — ALTEPLASE 4 MG: 2.2 INJECTION, POWDER, LYOPHILIZED, FOR SOLUTION INTRAVENOUS at 11:20

## 2022-12-12 NOTE — PROCEDURES
Interventional Radiology Post-Procedure Note   ?   Brief Procedure Note:   Patient name: Saulo Riley  Pt MRN:[id number]   Date of procedure: 12/12/2022     Procedure(s): Right hemiscrotal aspiration  Sedation method: Moderate sedation was not employed. Local lidocaine only.  Pre Procedure Diagnosis: Scrotal hematoma  Post Procedure Diagnosis: Same  Indications: Scrotal hematoma with pain and discomfort   ?   Attending: Rusty Fields M.D.  Specimen(s) removed: None   Additional studies ordered: None  Drains: None   Estimated Blood Loss: Minimal  Complications: None  Vascular closure method: N/A    Findings/Notes/Comments: Uncomplicated right hemiscrotal hematoma with 4mg in 10cc of tPA used to aid in aspiration with 15 minutes of indwelling time.  Total of 55cc of dark red fluid removed.  ?   Please see dictation in PACS or under the Imaging tab in [facility] for detailed procedure note.     Rusty Fields M.D.   Vascular and Interventional Radiology   Pager: [phone]   After Hours / Scheduling: [phone]     12/12/2022  12:00 PM

## 2022-12-12 NOTE — PROGRESS NOTES
A total of 55mls of dark red fluid aspirated from the right side. 35mls were aspirated prior to TPA injection, 20mls aspirated after. Pt tolerated procedure well.

## 2022-12-19 ENCOUNTER — HOSPITAL ENCOUNTER (OUTPATIENT)
Dept: ULTRASOUND IMAGING | Facility: CLINIC | Age: 62
Discharge: HOME | End: 2022-12-19
Attending: RADIOLOGY | Admitting: RADIOLOGY
Payer: COMMERCIAL

## 2022-12-19 DIAGNOSIS — S30.22XA SCROTAL HEMATOMA: ICD-10-CM

## 2022-12-19 PROCEDURE — 250N000009 HC RX 250: Performed by: RADIOLOGY

## 2022-12-19 PROCEDURE — 272N000431 US ASPIRATION OF SEROMA/HEMATOMA/ABSCESS/CYST

## 2022-12-19 RX ORDER — LIDOCAINE HYDROCHLORIDE 10 MG/ML
10 INJECTION, SOLUTION EPIDURAL; INFILTRATION; INTRACAUDAL; PERINEURAL ONCE
Status: COMPLETED | OUTPATIENT
Start: 2022-12-19 | End: 2022-12-19

## 2022-12-19 RX ADMIN — LIDOCAINE HYDROCHLORIDE 10 ML: 10 INJECTION, SOLUTION EPIDURAL; INFILTRATION; INTRACAUDAL; PERINEURAL at 11:05

## 2022-12-19 NOTE — PROGRESS NOTES
Right scrotal aspiration complete. Pt tolerated well. Did not use TPA. Dr. Delgado spoke with pt about how this hematoma keeps coming back and filling. Advised pt to talk with doctor at upcoming appt on Thursday to look at different options, can't keep draining, not a long term solution,have to fix the problem.  110 mls bloody fluid removed.    Reviewed discharge instructions with pt. Pt discharged via ambulation.

## 2022-12-22 ENCOUNTER — OFFICE VISIT (OUTPATIENT)
Dept: UROLOGY | Facility: CLINIC | Age: 62
End: 2022-12-22
Payer: COMMERCIAL

## 2022-12-22 ENCOUNTER — LAB (OUTPATIENT)
Dept: LAB | Facility: CLINIC | Age: 62
End: 2022-12-22
Payer: COMMERCIAL

## 2022-12-22 DIAGNOSIS — S30.22XA SCROTAL HEMATOMA: Primary | ICD-10-CM

## 2022-12-22 DIAGNOSIS — S30.22XA SCROTAL HEMATOMA: ICD-10-CM

## 2022-12-22 LAB
APTT PPP: 32 SECONDS (ref 22–38)
ERYTHROCYTE [DISTWIDTH] IN BLOOD BY AUTOMATED COUNT: 13.3 % (ref 10–15)
FIBRINOGEN PPP-MCNC: 443 MG/DL (ref 170–490)
HCT VFR BLD AUTO: 45.7 % (ref 40–53)
HGB BLD-MCNC: 14.4 G/DL (ref 13.3–17.7)
INR PPP: 0.97 (ref 0.85–1.15)
MCH RBC QN AUTO: 29.2 PG (ref 26.5–33)
MCHC RBC AUTO-ENTMCNC: 31.5 G/DL (ref 31.5–36.5)
MCV RBC AUTO: 93 FL (ref 78–100)
PLATELET # BLD AUTO: 198 10E3/UL (ref 150–450)
RBC # BLD AUTO: 4.93 10E6/UL (ref 4.4–5.9)
WBC # BLD AUTO: 8.7 10E3/UL (ref 4–11)

## 2022-12-22 PROCEDURE — 85610 PROTHROMBIN TIME: CPT

## 2022-12-22 PROCEDURE — 85384 FIBRINOGEN ACTIVITY: CPT

## 2022-12-22 PROCEDURE — 85027 COMPLETE CBC AUTOMATED: CPT

## 2022-12-22 PROCEDURE — 36415 COLL VENOUS BLD VENIPUNCTURE: CPT

## 2022-12-22 PROCEDURE — 85730 THROMBOPLASTIN TIME PARTIAL: CPT

## 2022-12-22 PROCEDURE — 99214 OFFICE O/P EST MOD 30 MIN: CPT | Performed by: STUDENT IN AN ORGANIZED HEALTH CARE EDUCATION/TRAINING PROGRAM

## 2022-12-22 PROCEDURE — 85041 AUTOMATED RBC COUNT: CPT

## 2022-12-22 NOTE — PROGRESS NOTES
CHIEF COMPLAINT   Saulo Riley who is a 62 year old male returns today for follow-up of chronic right orchalgia s/p right orchiectomy 10/27/2022 c/b right scrotal hematoma and s/p aspiration 11/23/2022, 12/2/2022, 12/12/022 w/ TPA administration, 12/19/2022    HPI   Saulo Riley is a 62 year old male returns today for follow-up of chronic right orchalgia s/p right orchiectomy 10/27/2022 c/b right scrotal hematoma and s/p aspiration 11/23/2022, 12/2/2022, 12/12/2022 w/ TPA administration, 12/19/2022    Hematoma has recurred once again despite aspiration 3 days ago with 110 ml bloody aspirate removed. Pain level at 5-6/10. He is using scrotal support all the time.    Patient is not aware of any bleeding disorders. He had a right open inguinal hernia repair in August 2022 and a right hydrocelectomy in Dec 2020 without any issues with bleeding.    PHYSICAL EXAM  Patient is a 62 year old  male   General Appearance Adult:   Alert, no acute distress, oriented  HENT: throat/mouth:normal, good dentition  Lungs: no respiratory distress, or pursed lip breathing  Heart: No obvious jugular venous distension present  Abdomen: nondistended  Musculoskeltal: extremities normal, no peripheral edema  Skin: no suspicious lesions or rashes  Neuro: Alert, oriented, speech and mentation normal  Psych: affect and mood normal  Gait: Normal  : right hemiscrotum with tense 6 cm hematoma, per patient this is increasing in size. Minimal tenderness to palpation    ASSESSMENT and PLAN  62 year old male returns today for follow-up of chronic right orchalgia w/ h/o right hydrocelectomy 12/2020, right open inguinal hernia repair 8/2022, s/p right orchiectomy 10/27/2022 c/b right scrotal hematoma and s/p aspiration 11/23/2022, 12/2/2022, 12/12/2022 w/ TPA administration, 12/19/2022    Dr. Catalan is not in the office and will not return until January    It appears that the hematoma is already recurring despite repeat aspiration just a few days  ago. I discussed with the patient that I have never seen so many recurrences of hematoma after an orchiectomy. It is extremely unlikely that there is a arterial bleed from the spermatic cord vessels given that he has remained hemodynamically stable throughout this course of recurrent hematoma, probably this is venous leakage. Nonetheless, I recommend checking labs to rule out development of a new bleeding disorder    Discussed options including repeat aspiration next week vs. Going to the operating room for scrotal exploration, hematoma washout and fulguration of any bleeding vessels. Per the operative report there was significant inflammation in the scrotum from prior surgery. He opts for repeat aspiration next week and follow up with Dr. Catalan in January once her returns to the office    - to lab for CBC, PT, INR, fibrinogen, if significant abnormality will notify PCP and may need hematology referral  - repeat scrotal aspiration next week  - follow up with Dr. Hossein Delgado MD   [facility] Urology  [facility] Phone: [phone]    Over 30 minutes spent on this encounter including review of prior records, discussion with patient, documentation, coordinating care, ordering labs

## 2022-12-22 NOTE — LETTER
12/22/2022       RE: Saulo Riley  [address] [facility] [address] [facility] [address]     Dear Colleague,    Thank you for referring your patient, Saulo Riley, to the [facility] UROLOGY CLINIC [place] at [facility]. Please see a copy of my visit note below.    CHIEF COMPLAINT   Saulo Riley who is a 62 year old male returns today for follow-up of chronic right orchalgia s/p right orchiectomy 10/27/2022 c/b right scrotal hematoma and s/p aspiration 11/23/2022, 12/2/2022, 12/12/022 w/ TPA administration, 12/19/2022    HPI   Saulo Riley is a 62 year old male returns today for follow-up of chronic right orchalgia s/p right orchiectomy 10/27/2022 c/b right scrotal hematoma and s/p aspiration 11/23/2022, 12/2/2022, 12/12/2022 w/ TPA administration, 12/19/2022    Hematoma has recurred once again despite aspiration 3 days ago with 110 ml bloody aspirate removed. Pain level at 5-6/10. He is using scrotal support all the time.    Patient is not aware of any bleeding disorders. He had a right open inguinal hernia repair in August 2022 and a right hydrocelectomy in Dec 2020 without any issues with bleeding.    PHYSICAL EXAM  Patient is a 62 year old  male   General Appearance Adult:   Alert, no acute distress, oriented  HENT: throat/mouth:normal, good dentition  Lungs: no respiratory distress, or pursed lip breathing  Heart: No obvious jugular venous distension present  Abdomen: nondistended  Musculoskeltal: extremities normal, no peripheral edema  Skin: no suspicious lesions or rashes  Neuro: Alert, oriented, speech and mentation normal  Psych: affect and mood normal  Gait: Normal  : right hemiscrotum with tense 6 cm hematoma, per patient this is increasing in size. Minimal tenderness to palpation    ASSESSMENT and PLAN  62 year old male returns today for follow-up of chronic right orchalgia w/ h/o right hydrocelectomy 12/2020, right open inguinal hernia repair  8/2022, s/p right orchiectomy 10/27/2022 c/b right scrotal hematoma and s/p aspiration 11/23/2022, 12/2/2022, 12/12/2022 w/ TPA administration, 12/19/2022    Dr. Catalan is not in the office and will not return until January    It appears that the hematoma is already recurring despite repeat aspiration just a few days ago. I discussed with the patient that I have never seen so many recurrences of hematoma after an orchiectomy. It is extremely unlikely that there is a arterial bleed from the spermatic cord vessels given that he has remained hemodynamically stable throughout this course of recurrent hematoma, probably this is venous leakage. Nonetheless, I recommend checking labs to rule out development of a new bleeding disorder    Discussed options including repeat aspiration next week vs. Going to the operating room for scrotal exploration, hematoma washout and fulguration of any bleeding vessels. Per the operative report there was significant inflammation in the scrotum from prior surgery. He opts for repeat aspiration next week and follow up with Dr. Catalan in January once her returns to the office    - to lab for CBC, PT, INR, fibrinogen, if significant abnormality will notify PCP and may need hematology referral  - repeat scrotal aspiration next week  - follow up with Dr. Hossein Delgado MD   [facility] Urology  [facility] Phone: [phone]    Over 30 minutes spent on this encounter including review of prior records, discussion with patient, documentation, coordinating care, ordering labs

## 2022-12-22 NOTE — NURSING NOTE
Chief Complaint   Patient presents with     scrotal swelling     Patient reports swelling is about up to baseball size again.    Jazlyn Granados, EMT

## 2022-12-29 ENCOUNTER — HOSPITAL ENCOUNTER (OUTPATIENT)
Dept: ULTRASOUND IMAGING | Facility: CLINIC | Age: 62
Discharge: HOME | End: 2022-12-29
Attending: STUDENT IN AN ORGANIZED HEALTH CARE EDUCATION/TRAINING PROGRAM | Admitting: STUDENT IN AN ORGANIZED HEALTH CARE EDUCATION/TRAINING PROGRAM
Payer: COMMERCIAL

## 2022-12-29 VITALS
SYSTOLIC BLOOD PRESSURE: 170 MMHG | RESPIRATION RATE: 18 BRPM | OXYGEN SATURATION: 97 % | DIASTOLIC BLOOD PRESSURE: 80 MMHG

## 2022-12-29 DIAGNOSIS — S30.22XA SCROTAL HEMATOMA: ICD-10-CM

## 2022-12-29 PROCEDURE — 250N000009 HC RX 250: Performed by: RADIOLOGY

## 2022-12-29 PROCEDURE — 272N000431 US ASPIRATION OF SEROMA/HEMATOMA/ABSCESS/CYST

## 2022-12-29 RX ADMIN — LIDOCAINE HYDROCHLORIDE 10 ML: 10 INJECTION, SOLUTION EPIDURAL; INFILTRATION; INTRACAUDAL; PERINEURAL at 14:00

## 2022-12-29 NOTE — PROGRESS NOTES
Image guided right scrotal hematoma aspiration done by Dr. Alfreod. 17 mls of light bloody fluid removed. Pt tolerated well. Left the department in stable and satisfactory condition. Plans to see Urologist Monday.

## 2023-01-02 ENCOUNTER — OFFICE VISIT (OUTPATIENT)
Dept: UROLOGY | Facility: CLINIC | Age: 63
End: 2023-01-02
Payer: COMMERCIAL

## 2023-01-02 VITALS — BODY MASS INDEX: 43.25 KG/M2 | WEIGHT: 292 LBS | HEIGHT: 69 IN

## 2023-01-02 DIAGNOSIS — R10.31 RIGHT INGUINAL PAIN: Primary | ICD-10-CM

## 2023-01-02 DIAGNOSIS — S30.22XA SCROTAL HEMATOMA: ICD-10-CM

## 2023-01-02 PROCEDURE — 99213 OFFICE O/P EST LOW 20 MIN: CPT | Performed by: STUDENT IN AN ORGANIZED HEALTH CARE EDUCATION/TRAINING PROGRAM

## 2023-01-02 RX ORDER — GABAPENTIN 100 MG/1
100 CAPSULE ORAL DAILY
Qty: 42 CAPSULE | Refills: 0 | Status: SHIPPED | OUTPATIENT
Start: 2023-01-02 | End: 2023-02-13

## 2023-01-02 RX ORDER — GABAPENTIN 100 MG/1
100 CAPSULE ORAL DAILY
Qty: 42 CAPSULE | Refills: 0 | Status: SHIPPED | OUTPATIENT
Start: 2023-01-02 | End: 2023-01-02 | Stop reason: ALTCHOICE

## 2023-01-02 ASSESSMENT — PAIN SCALES - GENERAL: PAINLEVEL: SEVERE PAIN (6)

## 2023-01-02 NOTE — PROGRESS NOTES
"CHIEF COMPLAINT   It was my pleasure to see Saulo Riley who is a 62 year old male for follow-up of scrotal hematoma after orchiectomy.      HPI:  Saulo Riley is a 62 year old male being seen for follow-up.  Duration of problem: 2 months  Previous treatments: Right inguinal orchiectomy on 10/27/2022  Hematoma aspirations last drawn on 12/29      Reviewed previous notes from Dr. Everardo Guzman still has persistent pain on his right groin  He had last aspiration done on 1229 with about 20 cc of clear mildly blood-tinged fluid aspirated as per the pictures taken on his mobile phone  Prior to that he had about 100 mL of bloody aspirate  Still has pain and difficulty in walking    Exam:  Ht 1.74 m (5' 8.5\")   Wt 132.5 kg (292 lb)   BMI 43.75 kg/m    General: age-appropriate appearing male in NAD sitting in an exam chair  Resp: no respiratory distress  CV: heart rate regular prostate early stent  Abdomen: Degree of obesity is severe. Abdomen is soft and nontender. No organomegaly.  : Hard area of scrotal collection on the right side much less than the last time I examined and does not appear to be anything that can be aspirated.  Possibly corresponds to the organized clot as seen on the last ultrasound on 12/29  Neuro: grossly non focal. Normal reflexes  Motor: excellent strength throughout    Review of Imaging:  The following imaging exams were independently viewed and interpreted by me and discussed with patient:  Ultrasound scrotum: Abnormal: 12/29/2022:The right scrotal lesion represents complex hematoma with minimal  liquefied components.  2.  Successful fluid aspiration of 20 mL of bloody fluid from the  liquefied regions of the hematoma.    Review of Labs:  The following labs were reviewed by me and discussed with the patient:  Fibrinogen, APTT, INR,: Normal    Assessment & Plan     Right inguinal pain  Persistent pain despite adequate aspirations  Some residual organized clot present in the scrotum does not " appear to be aspirated we will  Started him on gabapentin as it has helped him in the past  Referred here for pain management for possible nerve blocks if needed  - Pain Management  Referral; Future  - gabapentin (NEURONTIN) 100 MG capsule; Take 1 capsule (100 mg) by mouth daily for 42 days    Scrotal hematoma  Most of the aspirated no fluid within the scrotum is evacuated as per the last ultrasound  Clinically there is not anything that appears to be aspirated well at this point and the size of the scrotal swelling is much less than what it was during the last evaluation  Do not recommend any further aspirations at this point  Follow-up in 6 weeks        Zak Catalan MD  [facility] UROLOGY CLINIC [place]      ==========================    Additional Billing and Coding Information:  Review of external notes as documented above   Review of the result(s) of each unique test - Scrotal ultrasound, fibrinogen, PT, APTT    Independent interpretation of a test performed by another physician/other qualified health care professional (not separately reported) -       Discussion of management or test interpretation with external physician/other qualified healthcare professional/appropriate source -           12 minutes spent on the date of the encounter doing chart review, review of test results, interpretation of tests, patient visit and documentation     ==========================

## 2023-01-02 NOTE — NURSING NOTE
Chief Complaint   Patient presents with     Follow Up     Testicular swelling     Pt back for swelling, pt states this is painful but feels better after being drained.    Paulette Chavez, CMA

## 2023-01-02 NOTE — LETTER
"1/2/2023       RE: Saulo Riley  [address] [facility] [address]     Dear Colleague,    Thank you for referring your patient, Saulo Riley, to the [facility] [place] at [facility]. Please see a copy of my visit note below.    CHIEF COMPLAINT   It was my pleasure to see Saulo Riley who is a 62 year old male for follow-up of scrotal hematoma after orchiectomy.      HPI:  Saulo Riley is a 62 year old male being seen for follow-up.  Duration of problem: 2 months  Previous treatments: Right inguinal orchiectomy on 10/27/2022  Hematoma aspirations last drawn on 12/29      Reviewed previous notes from Dr. Everardo Guzman still has persistent pain on his right groin  He had last aspiration done on 1229 with about 20 cc of clear mildly blood-tinged fluid aspirated as per the pictures taken on his mobile phone  Prior to that he had about 100 mL of bloody aspirate  Still has pain and difficulty in walking    Exam:  Ht 1.74 m (5' 8.5\")   Wt 132.5 kg (292 lb)   BMI 43.75 kg/m    General: age-appropriate appearing male in NAD sitting in an exam chair  Resp: no respiratory distress  CV: heart rate regular prostate early stent  Abdomen: Degree of obesity is severe. Abdomen is soft and nontender. No organomegaly.  : Hard area of scrotal collection on the right side much less than the last time I examined and does not appear to be anything that can be aspirated.  Possibly corresponds to the organized clot as seen on the last ultrasound on 12/29  Neuro: grossly non focal. Normal reflexes  Motor: excellent strength throughout    Review of Imaging:  The following imaging exams were independently viewed and interpreted by me and discussed with patient:  Ultrasound scrotum: Abnormal: 12/29/2022:The right scrotal lesion represents complex hematoma with minimal  liquefied components.  2.  Successful fluid aspiration of 20 mL of bloody fluid from " the  liquefied regions of the hematoma.    Review of Labs:  The following labs were reviewed by me and discussed with the patient:  Fibrinogen, APTT, INR,: Normal    Assessment & Plan     Right inguinal pain  Persistent pain despite adequate aspirations  Some residual organized clot present in the scrotum does not appear to be aspirated we will  Started him on gabapentin as it has helped him in the past  Referred here for pain management for possible nerve blocks if needed  - Pain Management  Referral; Future  - gabapentin (NEURONTIN) 100 MG capsule; Take 1 capsule (100 mg) by mouth daily for 42 days    Scrotal hematoma  Most of the aspirated no fluid within the scrotum is evacuated as per the last ultrasound  Clinically there is not anything that appears to be aspirated well at this point and the size of the scrotal swelling is much less than what it was during the last evaluation  Do not recommend any further aspirations at this point  Follow-up in 6 weeks        Zak Catalan MD  [facility] [place]      ==========================    Additional Billing and Coding Information:  Review of external notes as documented above   Review of the result(s) of each unique test - Scrotal ultrasound, fibrinogen, PT, APTT    Independent interpretation of a test performed by another physician/other qualified health care professional (not separately reported) -       Discussion of management or test interpretation with external physician/other qualified healthcare professional/appropriate source -           12 minutes spent on the date of the encounter doing chart review, review of test results, interpretation of tests, patient visit and documentation

## 2023-02-13 ENCOUNTER — OFFICE VISIT (OUTPATIENT)
Dept: UROLOGY | Facility: CLINIC | Age: 63
End: 2023-02-13
Payer: COMMERCIAL

## 2023-02-13 VITALS
BODY MASS INDEX: 44.14 KG/M2 | DIASTOLIC BLOOD PRESSURE: 86 MMHG | HEIGHT: 69 IN | WEIGHT: 298 LBS | SYSTOLIC BLOOD PRESSURE: 140 MMHG

## 2023-02-13 DIAGNOSIS — R10.31 RIGHT INGUINAL PAIN: Primary | ICD-10-CM

## 2023-02-13 PROCEDURE — 99213 OFFICE O/P EST LOW 20 MIN: CPT | Performed by: STUDENT IN AN ORGANIZED HEALTH CARE EDUCATION/TRAINING PROGRAM

## 2023-02-13 ASSESSMENT — PAIN SCALES - GENERAL: PAINLEVEL: SEVERE PAIN (7)

## 2023-02-13 NOTE — LETTER
"2/13/2023       RE: Saulo Riley  [address] [address] [facility] [address]     Dear Colleague,    Thank you for referring your patient, Saulo Riley, to the [facility] [place] at [facility]. Please see a copy of my visit note below.    CHIEF COMPLAINT   It was my pleasure to see Saulo Riley who is a 62 year old male for follow-up of right inguinal pain post orchiectomy.      HPI:  Saulo Riley is a 62 year old male being seen for right inguinal pain.  Duration of problem: Over a year  Previous treatments: Right inguinal hernia repair, right orchiectomy    Significantly reduced size of the hematoma which appears to be resolving  Pain persist to be similar difficulty tying his shoelaces  Finds it difficult to stand for long hours can go back to work,  Feels that his left testis is increasing in size    Exam:  BP (!) 140/86   Ht 1.74 m (5' 8.5\")   Wt 135.2 kg (298 lb)   BMI 44.65 kg/m    General: age-appropriate appearing male in NAD sitting in an exam chair  Resp: no respiratory distress  CV: heart rate regular  Abdomen: Degree of obesity is severe. Abdomen is soft and nontender. No organomegaly.  : Normal left testis, no masses palpable  Significantly reduced size of the resolving hematoma firm and nontender  Neuro: grossly non focal. Normal reflexes  Motor: excellent strength throughout    Review of Imaging:  The following imaging exams were independently viewed and interpreted by me and discussed with patient:      Review of Labs:  The following labs were reviewed by me and discussed with the patient:      Assessment & Plan     Right inguinal pain  Discussed with him about his need to see pain service for inguinal pain  Despite all efforts his inguinal pain has persisted and I think he would be better off with pain management treating him considering our efforts with hernia repair as well as orchiectomy.  Hematoma has been " resolving and has decreased significantly as compared to the last time and appears more organized.  Discussed with him that this should be resolving in the next few weeks  Did not feel that there was any mass on the left side, not sure for his feeling of enlargement on the left.  I will also send a message to the pain clinic to have him seen earlier and also placed a referral for physical therapy so that he can get back to work early.  - Physical Therapy Referral; Future      Zak Catalan MD  [facility] [place]      ==========================    Additional Billing and Coding Information:  Review of external notes as documented above     Independent interpretation of a test performed by another physician/other qualified health care professional (not separately reported) -     Discussion of management or test interpretation with external physician/other qualified healthcare professional/appropriate source -           15 minutes spent on the date of the encounter doing chart review, patient visit and documentation

## 2023-02-13 NOTE — PROGRESS NOTES
"CHIEF COMPLAINT   It was my pleasure to see Saulo Riley who is a 62 year old male for follow-up of right inguinal pain post orchiectomy.      HPI:  Saulo Riley is a 62 year old male being seen for right inguinal pain.  Duration of problem: Over a year  Previous treatments: Right inguinal hernia repair, right orchiectomy    Significantly reduced size of the hematoma which appears to be resolving  Pain persist to be similar difficulty tying his shoelaces  Finds it difficult to stand for long hours can go back to work,  Feels that his left testis is increasing in size    Exam:  BP (!) 140/86   Ht 1.74 m (5' 8.5\")   Wt 135.2 kg (298 lb)   BMI 44.65 kg/m    General: age-appropriate appearing male in NAD sitting in an exam chair  Resp: no respiratory distress  CV: heart rate regular  Abdomen: Degree of obesity is severe. Abdomen is soft and nontender. No organomegaly.  : Normal left testis, no masses palpable  Significantly reduced size of the resolving hematoma firm and nontender  Neuro: grossly non focal. Normal reflexes  Motor: excellent strength throughout    Review of Imaging:  The following imaging exams were independently viewed and interpreted by me and discussed with patient:      Review of Labs:  The following labs were reviewed by me and discussed with the patient:      Assessment & Plan     Right inguinal pain  Discussed with him about his need to see pain service for inguinal pain  Despite all efforts his inguinal pain has persisted and I think he would be better off with pain management treating him considering our efforts with hernia repair as well as orchiectomy.  Hematoma has been resolving and has decreased significantly as compared to the last time and appears more organized.  Discussed with him that this should be resolving in the next few weeks  Did not feel that there was any mass on the left side, not sure for his feeling of enlargement on the left.  I will also send a message to the " pain clinic to have him seen earlier and also placed a referral for physical therapy so that he can get back to work early.  - Physical Therapy Referral; Future      Zak Catalan MD  [facility] UROLOGY [facility]      ==========================    Additional Billing and Coding Information:  Review of external notes as documented above     Independent interpretation of a test performed by another physician/other qualified health care professional (not separately reported) -     Discussion of management or test interpretation with external physician/other qualified healthcare professional/appropriate source -           15 minutes spent on the date of the encounter doing chart review, patient visit and documentation     ==========================

## 2023-02-13 NOTE — NURSING NOTE
Chief Complaint   Patient presents with     Follow Up     Scrotal swelling following orchiectomy     Pt. Still having scrotal pain and swelling, a constant ache.    Jazlyn Granados, EMT

## 2023-03-13 ENCOUNTER — THERAPY VISIT (OUTPATIENT)
Dept: PHYSICAL THERAPY | Facility: CLINIC | Age: 63
End: 2023-03-13
Payer: COMMERCIAL

## 2023-03-13 DIAGNOSIS — R10.31 RIGHT INGUINAL PAIN: ICD-10-CM

## 2023-03-13 PROCEDURE — 97535 SELF CARE MNGMENT TRAINING: CPT | Mod: GP | Performed by: PHYSICAL THERAPIST

## 2023-03-13 PROCEDURE — 97162 PT EVAL MOD COMPLEX 30 MIN: CPT | Mod: GP | Performed by: PHYSICAL THERAPIST

## 2023-03-13 NOTE — PROGRESS NOTES
Physical Therapy Initial Evaluation  Subjective:  Patient here today for severe R groin/leg pain. Hx of 6 surgeries and many ultrasounds/CT scans/urology appts over the past 3 years (including lumbar surgery, hernia repair, R testicle removed) as started with R testicular pain/swelling. Last surgery Oct. 2022 to remove testicle. However, surgeries haven't helped and pain is actually worse. PAIN IS WORSE with standing, walking, tying shoes, can't move right leg up to tie shoe, get in truck, lifting R leg. BETTER with putting pressure on penis and groin area but temporary relief.     The history is provided by the patient.   Patient Health History         Pain is reported as 6/10 on pain scale.  General health as reported by patient is good.  Pertinent medical history includes: diabetes.     Medical allergies: none.   Surgeries include:  Orthopedic surgery. Other surgery history details: 6 total (groin/back).    Current medications:  High blood pressure medication, muscle relaxants and pain medication.    Current occupation is [employer], unable to work since Nov 1st.                                       Objective:    Gait:  Unable to fully WB on R LE, decreased R hip flexion.  Gait Type:  Antalgic                                                      Hip Evaluation  HIP AROM:  : Rhip flexion ~ 10-20 deg.                  Hip PROM:  : Severe restriction R hip AROM/PROM. R hip flexion ~ 60 deg, unable to get to neutral rotation due to severely restricted IR..Sharp pain with all movement. Unable to get in scour/FADIR position.                           Hip Strength:  : unable to fully strength test due to pain in R hip.                                         General     ROS    Assessment/Plan:    Patient is a 63 year old male with right side hip complaints.    Patient has the following significant findings with corresponding treatment plan.                Diagnosis 1:  R hip pain  Pain -  hot/cold therapy, manual therapy,  splint/taping/bracing/orthotics, self management, education, directional preference exercise and home program  Decreased ROM/flexibility - manual therapy and therapeutic exercise  Decreased joint mobility - manual therapy and therapeutic exercise  Decreased strength - therapeutic exercise and therapeutic activities  Impaired balance - neuro re-education and therapeutic activities  Decreased proprioception - neuro re-education and therapeutic activities  Inflammation - self management/home program  Impaired gait - gait training  Impaired muscle performance - neuro re-education  Decreased function - therapeutic activities  Impaired posture - neuro re-education    Therapy Evaluation Codes:   1) History comprised of:   Personal factors that impact the plan of care:      Time since onset of symptoms.    Comorbidity factors that impact the plan of care are:      High blood pressure and Overweight.     Medications impacting care: Anti-inflammatory, High blood pressure and Pain.  2) Examination of Body Systems comprised of:   Body structures and functions that impact the plan of care:      Hip.   Activity limitations that impact the plan of care are:      Driving, Stairs, Standing, Walking and Working.  3) Clinical presentation characteristics are:   Evolving/Changing.  4) Decision-Making    Moderate complexity using standardized patient assessment instrument and/or measureable assessment of functional outcome.  Cumulative Therapy Evaluation is: Moderate complexity.    Previous and current functional limitations:  (See Goal Flow Sheet for this information)    Short term and Long term goals: (See Goal Flow Sheet for this information)     Communication ability:  Patient appears to be able to clearly communicate and understand verbal and written communication and follow directions correctly.  Treatment Explanation - The following has been discussed with the patient:   RX ordered/plan of care  Anticipated outcomes  Possible  risks and side effects  This patient would benefit from PT intervention to resume normal activities.   Rehab potential is questionable.    Frequency:  1 X week, once daily  Duration:  for 8 weeks  Discharge Plan:  Achieve all LTG.  Independent in home treatment program.  Reach maximal therapeutic benefit.    Please refer to the daily flowsheet for treatment today, total treatment time and time spent performing 1:1 timed codes.

## 2023-03-15 PROBLEM — R10.31 RIGHT INGUINAL PAIN: Status: ACTIVE | Noted: 2023-03-15

## 2025-05-22 NOTE — PROGRESS NOTES
Re:  Saulo Riley- 1960    Dear Dr. Herring,    I had the pleasure of seeing Saulo today in follow-up for his right inguinal hernia repair with mesh on 8/11/22. We reviewed the operative findings again today which showed a small fat-containing direct hernia. He did well initially but notes that his pre-op symptoms of right testicle pain have recurred and persisted since about two weeks ago. He continues to feel relief when manually pushing the testicle upwards towards the groin.    On exam, the patient's incision is healing well without signs of infection. There is no clinical hernia present. His right testicle seems grossly normal and is non-tender on exam.    Saulo is doing well postoperatively. His pre-op testicular symptoms persist and are therefore unlikely driven by the fact he had a hernia on this same side. I've encouraged him to return to his urologist for any additional treatment.      Sincerely,         Jose Solares MD      Please route or send letter to:  Primary Care Provider (PCP)         no

## (undated) DEVICE — BAG CLEAR TRASH 1.3M 39X33" P4040C

## (undated) DEVICE — GLOVE PROTEXIS POWDER FREE 7.5 ORTHOPEDIC 2D73ET75

## (undated) DEVICE — SUCTION TIP YANKAUER W/O VENT K86

## (undated) DEVICE — GLOVE PROTEXIS BLUE W/NEU-THERA 8.0  2D73EB80

## (undated) DEVICE — PREP POVIDONE IODINE SOLUTION 10% 4OZ BOTTLE 29906-004

## (undated) DEVICE — PACK MINOR CUSTOM RIDGES SBA32RMRMA

## (undated) DEVICE — SUPPORTER ATHLETIC LG LATEX 202636

## (undated) DEVICE — DRSG STERI STRIP 1/2X4" R1547

## (undated) DEVICE — LINEN HALF SHEET 5512

## (undated) DEVICE — ESU GROUND PAD ADULT W/CORD E7507

## (undated) DEVICE — ESU ELEC BLADE 2.75" COATED/INSULATED E1455

## (undated) DEVICE — SU PDS II 2-0 CT-2 27"  Z333H

## (undated) DEVICE — PREP POVIDONE-IODINE 7.5% SCRUB 4OZ BOTTLE MDS093945

## (undated) DEVICE — LINEN TOWEL PACK X10 5473

## (undated) DEVICE — SU VICRYL 3-0 SH 27" UND J416H

## (undated) DEVICE — TUBING SUCTION 12"X1/4" N612

## (undated) DEVICE — DRSG TELFA 2X3"

## (undated) DEVICE — BLADE CLIPPER 3M 9670

## (undated) DEVICE — LINEN FULL SHEET 5511

## (undated) DEVICE — DRAPE IOBAN INCISE 23X17" 6650EZ

## (undated) DEVICE — DRSG KERLIX FLUFFS X5

## (undated) DEVICE — SUCTION CANISTER MEDIVAC LINER 3000ML W/LID 65651-530

## (undated) DEVICE — SU VICRYL 3-0 TIE 12X18" J904T

## (undated) DEVICE — TRAY PREP DRY SKIN SCRUB 067

## (undated) DEVICE — SU SILK 2-0 SH 30" K833H

## (undated) DEVICE — DRAIN PENROSE 0.25"X12" LATEX FREE GR101

## (undated) DEVICE — SU VICRYL 3-0 SH 27" J316H

## (undated) DEVICE — NDL 22GA 1.5"

## (undated) DEVICE — DRAPE LAP W/ARMBOARD 29410

## (undated) DEVICE — SU VICRYL 4-0 PS-2 18" UND J496H

## (undated) DEVICE — LINEN TOWEL PACK X5 5464

## (undated) DEVICE — SU SILK 0 24" TIE SA76G

## (undated) DEVICE — SU MONOCRYL 4-0 PS-2 27" UND Y426H

## (undated) DEVICE — SU DERMABOND ADVANCED .7ML DNX6

## (undated) DEVICE — PREP CHLORAPREP 26ML TINTED HI-LITE ORANGE 930815

## (undated) DEVICE — SU PDS II 0 CT-2 27" Z334H

## (undated) DEVICE — SPONGE KITTNER 30-101

## (undated) DEVICE — GLOVE BIOGEL PI ULTRATOUCH SZ 6.5 41165

## (undated) DEVICE — DRAIN PENROSE 0.50"X18" LATEX FREE GR203

## (undated) DEVICE — SOL NACL 0.9% IRRIG 1000ML BOTTLE 2F7124

## (undated) DEVICE — SU PROLENE 2-0 CT-2 30" 8411H

## (undated) DEVICE — SU VICRYL 0 UR-6 27" J603H

## (undated) DEVICE — SPONGE LAP 4X18" X8415

## (undated) DEVICE — GLOVE BIOGEL PI MICRO INDICATOR UNDERGLOVE SZ 7.0 48970

## (undated) DEVICE — ADH SKIN CLOSURE PREMIERPRO EXOFIN MICOR HV 0.5ML 3471

## (undated) RX ORDER — PROPOFOL 10 MG/ML
INJECTION, EMULSION INTRAVENOUS
Status: DISPENSED
Start: 2022-10-27

## (undated) RX ORDER — GLYCOPYRROLATE 0.2 MG/ML
INJECTION INTRAMUSCULAR; INTRAVENOUS
Status: DISPENSED
Start: 2022-10-27

## (undated) RX ORDER — FENTANYL CITRATE 50 UG/ML
INJECTION, SOLUTION INTRAMUSCULAR; INTRAVENOUS
Status: DISPENSED
Start: 2022-10-27

## (undated) RX ORDER — KETOROLAC TROMETHAMINE 30 MG/ML
INJECTION, SOLUTION INTRAMUSCULAR; INTRAVENOUS
Status: DISPENSED
Start: 2022-10-27

## (undated) RX ORDER — FENTANYL CITRATE 50 UG/ML
INJECTION, SOLUTION INTRAMUSCULAR; INTRAVENOUS
Status: DISPENSED
Start: 2022-08-11

## (undated) RX ORDER — FENTANYL CITRATE-0.9 % NACL/PF 10 MCG/ML
PLASTIC BAG, INJECTION (ML) INTRAVENOUS
Status: DISPENSED
Start: 2022-08-11

## (undated) RX ORDER — ONDANSETRON 2 MG/ML
INJECTION INTRAMUSCULAR; INTRAVENOUS
Status: DISPENSED
Start: 2022-10-27

## (undated) RX ORDER — HYDROCODONE BITARTRATE AND ACETAMINOPHEN 5; 325 MG/1; MG/1
TABLET ORAL
Status: DISPENSED
Start: 2022-10-27

## (undated) RX ORDER — BUPIVACAINE HYDROCHLORIDE 2.5 MG/ML
INJECTION, SOLUTION EPIDURAL; INFILTRATION; INTRACAUDAL
Status: DISPENSED
Start: 2022-10-27

## (undated) RX ORDER — BUPIVACAINE HYDROCHLORIDE 5 MG/ML
INJECTION, SOLUTION EPIDURAL; INTRACAUDAL
Status: DISPENSED
Start: 2022-08-11

## (undated) RX ORDER — DEXAMETHASONE SODIUM PHOSPHATE 4 MG/ML
INJECTION, SOLUTION INTRA-ARTICULAR; INTRALESIONAL; INTRAMUSCULAR; INTRAVENOUS; SOFT TISSUE
Status: DISPENSED
Start: 2022-10-27

## (undated) RX ORDER — LIDOCAINE HYDROCHLORIDE 10 MG/ML
INJECTION, SOLUTION EPIDURAL; INFILTRATION; INTRACAUDAL; PERINEURAL
Status: DISPENSED
Start: 2022-10-27

## (undated) RX ORDER — LIDOCAINE HYDROCHLORIDE 10 MG/ML
INJECTION, SOLUTION EPIDURAL; INFILTRATION; INTRACAUDAL; PERINEURAL
Status: DISPENSED
Start: 2022-08-11

## (undated) RX ORDER — PROPOFOL 10 MG/ML
INJECTION, EMULSION INTRAVENOUS
Status: DISPENSED
Start: 2022-08-11